# Patient Record
Sex: FEMALE | Race: WHITE | NOT HISPANIC OR LATINO | Employment: FULL TIME | URBAN - METROPOLITAN AREA
[De-identification: names, ages, dates, MRNs, and addresses within clinical notes are randomized per-mention and may not be internally consistent; named-entity substitution may affect disease eponyms.]

---

## 2022-11-07 ENCOUNTER — TELEPHONE (OUTPATIENT)
Dept: OBGYN CLINIC | Facility: CLINIC | Age: 32
End: 2022-11-07

## 2022-11-07 DIAGNOSIS — O20.9 BLEEDING IN EARLY PREGNANCY: Primary | ICD-10-CM

## 2022-11-07 NOTE — TELEPHONE ENCOUNTER
Called and spoke with patient to follow-up and provide an update  Our  did look into the situation regarding the [place] [employer] not transferring her call and they confirmed that they do have voice recording of the conversation on the third call with the representative and that should of never had happened  They will address the situation with the particular representative so it will not happen again  This nurse apologized to the patient again regarding the situation  She still having the same period-type bleeding like earlier today and did had blood work done this morning  Advised will watch for the results and call her back tomorrow regarding the plan  She voiced appreciation

## 2022-11-07 NOTE — TELEPHONE ENCOUNTER
Patient called and l/m with the  in regards to canceling her first prenatal visit scheduled for 22  Called and spoke with patient, over the weekend she started with period-type bleeding and cramping and so therefore she attempt to contact [email or web address] service  The first two calls on 22 10:59 a &1103 a went straight to voicemail she stated and did not get a hold of anyone, then the third call at 1113 a she was able to get a hold of [email or web address] service and spoke with a women (pt did not remember name) and declined to transfer her call to on-call provider as she informed the patient that since she is not a patient of ours, unable to further address her call  Patient called and left message for the  staff stating she had a miscarriage over the weekend and requesting an appointment on Monday to follow-up and unsure to cancel her first prenatal or not  Pt is a patient of ours (jozef in ) and had her daughter with us  She is   LMP 10/03/22  Blood type B (+)  Should be around 5 w pregnant  She uses [facility] will talk to on-call provider Dr Marc Ellis to further address  Spoke with Dr Edwards and since patient is around 5wks, it will be hard to confirm viability  Will order beta HCG to determine levels and where she is at in the pregnancy, if negative then will follow-up for a consult, if low then different route of following up  Call and spoke with pt request depending on the results, if SAB, would like to come into the office to follow-up and have questions  Advised her will have her get HCG at [facility] (Order placed and pt request to be sent to [facility] in [place]) faxed order to [facility] in [place] per pt request with fax confirmation  Informed her will call her to follow-up with the results and determine a plan of care  She voiced appreciation

## 2022-11-08 ENCOUNTER — OFFICE VISIT (OUTPATIENT)
Dept: OBGYN CLINIC | Facility: CLINIC | Age: 32
End: 2022-11-08

## 2022-11-08 VITALS
DIASTOLIC BLOOD PRESSURE: 68 MMHG | SYSTOLIC BLOOD PRESSURE: 110 MMHG | WEIGHT: 130.6 LBS | HEIGHT: 65 IN | BODY MASS INDEX: 21.76 KG/M2

## 2022-11-08 DIAGNOSIS — Z67.20 BLOOD TYPE B+: ICD-10-CM

## 2022-11-08 DIAGNOSIS — O03.9 MISCARRIAGE: Primary | ICD-10-CM

## 2022-11-08 LAB — HCG INTACT+B SERPL-ACNC: 3 MIU/ML

## 2022-11-08 NOTE — PROGRESS NOTES
[address] [facility], [address], [facility], [address]    Assessment/Plan:  1  Miscarriage  Comments:  Patient approx 5 weeks with + UPT at home  Bleeding started Saturday  Blood HCG yesterday negative  Bleeding is decreasing  A/P:   Reviewed with patient that bleeding and results are consistent with a miscarriage  Since the hcg level is now negative, this is reassuring that the tissue has passed and bleeding will stop in next week or so  Miscarriages can often be heavier bleeding than a routine period  She can take Motrin or Tylenol if needed for cramping  Reviewed miscarriage in early first trimester occurs in approx 1:5 pregnancies and is usually due to chromosomal abnormality that occurs early in development of fetus that is incompatable with life  This is not due to something the patient ate, drank or did  This is completely spontaneous, although it does occur more frequently as women age  If wishes conception, recom cont PNV daily  May attempt conception again after first normal period following SAB/procedure  2  Blood type B+  Comments:  blood type in prior records B+        Subjective:   Bubba Torres is a 34 y o   female  CC: still bleeding from miscarriage      HPI:   Presents to follow up miscarriage  She reports LMP 10/3/2022 with regular periods  She has been attempting pregnancy just last 2 months  Pt states 3 positive UPT day first 10/31/2022  No issues until Saturday  Bleeding started like a period  Sat even and , Monday - heavier than usual period  More like normal period now  Cramping has improved  She was scheduled for first PNV later this month  She called office Monday and reviewed bleeding  Was given order for hcg - HCG done yesterday was 3 (negative)  H/o  full term   Blood type B (+)  Gyn History  Patient's last menstrual period was 10/03/2022 (exact date)         Last pap smear: Not on file    She  reports previously being sexually active  OB History      No past medical history on file  Past Surgical History:  2004: WISDOM TOOTH EXTRACTION; N/A     Social History     Tobacco Use   • Smoking status: Never Smoker   • Smokeless tobacco: Never Used   Vaping Use   • Vaping Use: Never used   Substance Use Topics   • Alcohol use: Not Currently   • Drug use: Never          Current Outpatient Medications:   •  Prenatal Vit-Fe Fumarate-FA (PRENATAL VITAMINS PO), Take 1 tablet by mouth Daily at 2am, Disp: , Rfl:     She is allergic to amoxicillin       ROS: Review of Systems   Constitutional: Negative  Gastrointestinal: Negative  Genitourinary: Positive for pelvic pain (cramping is improving) and vaginal bleeding (period like)  Psychiatric/Behavioral: Negative  Objective:  /68   Ht 5' 4 75" (1 645 m)   Wt 59 2 kg (130 lb 9 6 oz)   LMP 10/03/2022 (Exact Date)   Breastfeeding No   BMI 21 90 kg/m²      Physical Exam  Constitutional:       Appearance: Normal appearance  Genitourinary:     Comments: Patient declined pelvic exam today  Neurological:      Mental Status: She is alert and oriented to person, place, and time     Psychiatric:         Behavior: Behavior normal

## 2022-11-08 NOTE — TELEPHONE ENCOUNTER
HCG results came back  Resulted at 3 which is consider a negative HCG result  Pt request an appointment to come in to further discuss with the provider regarding the SAB as she have questions and request an appointment today if possible  Advised her will transfer to her  to schedule and will cancel first prenatal visit

## 2022-12-30 ENCOUNTER — TELEPHONE (OUTPATIENT)
Dept: OBGYN CLINIC | Facility: CLINIC | Age: 32
End: 2022-12-30

## 2022-12-30 DIAGNOSIS — O26.899 PELVIC PAIN IN PREGNANCY: Primary | ICD-10-CM

## 2022-12-30 DIAGNOSIS — R10.2 PELVIC PAIN IN PREGNANCY: Primary | ICD-10-CM

## 2022-12-30 NOTE — TELEPHONE ENCOUNTER
Pt called informing she received a +HPT on 12/25/22  LMP 11/28/22, pt approx 4w4d GA  Pt reports onset on LLQ pelvic "burning" sensation, denies pain or bleeding  Pt report she had a recent miscarriage in early November and feels she had similar discomfort with her  pregnancy loss and expressed she is concerned for ectopic pregnancy  Preferred lab: [facility]  Blood type B+ in prior records  Discussed with on call provider Dr Ct Remy, orders given to have pt get an HCG level tomorrow and repeat on 1/3/23, will schedule for early US once levels are appropriate  May take Tylenol as needed  Ectopic and bleeding precautions provided  Reassure and support given      Pt appreciative of call back and agreed to plan       HCG orders transmitted to lab Per[facility]

## 2023-01-03 NOTE — TELEPHONE ENCOUNTER
Dilma llamas she was not able to go for her labs on Saturday  She will go Tuesday, asking when she should go again  Return call to Dilma Vasques, pain has improved since last week  Advised to repeat HCG on Thursday  Check patient portal for results tomorrow  Reviewed ectopic precautions  Patient verbalized understanding

## 2023-01-04 LAB — HCG INTACT+B SERPL-ACNC: NORMAL MIU/ML

## 2023-01-06 DIAGNOSIS — O03.9 MISCARRIAGE: Primary | ICD-10-CM

## 2023-01-06 LAB — HCG INTACT+B SERPL-ACNC: NORMAL MIU/ML

## 2023-01-06 NOTE — RESULT ENCOUNTER NOTE
Spoke with patient and informed Hcg level increasing,  recommend repeating levels on Saturday and Monday to follow trend and schedule ultrasound in office next week  Informed labs have been sent to [facility] to continue ectopic precautions  Pt transferred to reception to schedule US appointment for next week  Pt verbalized an understanding of treatment plan

## 2023-01-09 NOTE — PROGRESS NOTES
[address] [facility], [address], [facility], [address]    Assessment/Plan:  1  History of miscarriage, currently pregnant  Comments:  history of chemical pregnancy 2022  HCGs rising in current pregnancy  LMP 2022  A/P:   Ultrasound today confirms SLIUP with + fetal heart tones  CRL is measuring 6 1wks, which is consistent with GA by LMP  Discussed this rules out ectopic pregnancy  Patient will schedule her first PN visit with doctor and nurses in 2-3 weeks  All questions answered  Subjective:   Chery Tillman is a 34 y o   female  CC: here for ultrasound      HPI:   Isra Madrigal presents for early ultrasound in pregnancy with h/o miscarriage 2022  She called office 2022 c/o LLQ pelvic "burning sensation" which she noted when she had miscarriage last   She reports she received a +HPT on 22  LMP 22, pt approx 4w4d GA at that time  She denied pain or bleeding  She was offered hcg given h/o miscarriage and concern  She was unable to go immediately for hcg but did go eventually   - 1/3/2023 76,119   - 2023 36,604 (24% increase)    2023 results were reviewed with Isra Madrigal - all symptoms resolved, no bleeding or pain ever  Offered u/s given hcg above discriminatory zone but would unlikely show FHT at that level  Pt wishes to wait until 6 weeks for u/s to hope to confirm FHT  She had 2023 hcg which increased and was supposed to repeat hcg 2023 (not done) and 2023 which was 79,858  Blood type B+    Today she denies pain or spotting  Mild nausea  Gyn History  Patient's last menstrual period was 2022 (exact date)  She  reports that she is not currently sexually active and has had partner(s) who are male         OB History  # 1 - Date: 2020, Sex: Female, Weight: None, GA: 39w0d, Delivery: Vaginal, Spontaneous, Apgar1: None, Apgar5: None, Living: Living, Birth Comments: None    # 2 - Date: 2022, Sex: None, Weight: None, GA: 5w0d, Delivery: Spontaneous , Apgar1: None, Apgar5: None, Living: None, Birth Comments: chemical pregnancy    # 3 - Date: None, Sex: None, Weight: None, GA: None, Delivery: None, Apgar1: None, Apgar5: None, Living: None, Birth Comments: None    #3 - Current pregnancy      No past medical history on file  Past Surgical History:  2004: WISDOM TOOTH EXTRACTION; N/A     Social History     Tobacco Use   • Smoking status: Never   • Smokeless tobacco: Never   Vaping Use   • Vaping Use: Never used   Substance Use Topics   • Alcohol use: Not Currently   • Drug use: Never          Current Outpatient Medications:   •  Ascorbic Acid (Vitamin C) 500 MG/5ML LIQD, as directed Orally, Disp: , Rfl:   •  Cholecalciferol (Vitamin D) 50 MCG (2000 UT) CAPS, every 24 hours, Disp: , Rfl:   •  Cyanocobalamin (Vitamin B-12) 1000 MCG/15ML LIQD, every 24 hours, Disp: , Rfl:   •  Milk Thistle 1000 MG CAPS, as directed Orally, Disp: , Rfl:   •  Omega-3 Fatty Acids (Fish Oil) 1200 MG CAPS, 1 capsule every 24 hours, Disp: , Rfl:   •  Prenatal Vit-Fe Fumarate-FA (PRENATAL VITAMINS PO), Take 1 tablet by mouth Daily at 2am, Disp: , Rfl:   •  saccharomyces boulardii (FLORASTOR) 250 mg capsule, Take 250 mg by mouth Daily at 2am, Disp: , Rfl:     She is allergic to amoxicillin       ROS: Review of Systems   Constitutional: Negative  Gastrointestinal: Positive for nausea (mild)  Negative for vomiting  Genitourinary: Negative  Psychiatric/Behavioral: Negative  Objective:  BP 90/62   Ht 5' 5" (1 651 m)   Wt 60 1 kg (132 lb 9 6 oz)   LMP 2022 (Exact Date)   BMI 22 07 kg/m²      Physical Exam  Constitutional:       Appearance: Normal appearance  Genitourinary:     General: Normal vulva  Vagina: Normal       Cervix: Normal       Uterus: Normal  Not enlarged and not tender  Adnexa:         Right: No mass or tenderness  Left: No mass or tenderness          Rectum: No external hemorrhoid  Neurological:      Mental Status: She is alert  Psychiatric:         Mood and Affect: Mood normal          Behavior: Behavior normal        FIRST TRIMESTER OBSTETRIC ULTRASOUND  01/10/23     Darinel Mahre MD     INDICATION: Amenorrhea, viability, h/o miscarriage     COMPARISON: none     TECHNIQUE:   Transvaginal imaging was performed to assess the gestation and myometrial/endometrial architecture       The study includes volumetric sweeps and traditional still imaging technique       FINDINGS:     A single intrauterine gestation is identified  Cardiac activity is detected at 104bpm       YOLK SAC:  Present and normal in size and appearance  MEAN CROWN RUMP LENGTH:  4 8 mm = 6 weeks 1 days   AMNIOTIC FLUID/SAC SHAPE:  Within expected normal range      UTERUS:   No uterine abnormalities noted      IMPRESSION:     Patient's last menstrual period was 11/28/2022 (exact date)  Gestational age based on LMP: 5w3d   Gestational age based on US: 5w3d     Will assign dating based on LMP which is c/w u/s today    Final Gestational age: 5w3d  Final Estimated Date of Delivery: 9/4/2023

## 2023-01-10 ENCOUNTER — OFFICE VISIT (OUTPATIENT)
Dept: OBGYN CLINIC | Facility: CLINIC | Age: 33
End: 2023-01-10

## 2023-01-10 VITALS
DIASTOLIC BLOOD PRESSURE: 62 MMHG | SYSTOLIC BLOOD PRESSURE: 90 MMHG | HEIGHT: 65 IN | WEIGHT: 132.6 LBS | BODY MASS INDEX: 22.09 KG/M2

## 2023-01-10 DIAGNOSIS — O09.299 HISTORY OF MISCARRIAGE, CURRENTLY PREGNANT: Primary | ICD-10-CM

## 2023-01-10 LAB — HCG INTACT+B SERPL-ACNC: NORMAL MIU/ML

## 2023-01-10 RX ORDER — MULTIVIT-MIN/IRON/FOLIC ACID/K 18-600-40
CAPSULE ORAL EVERY 24 HOURS
COMMUNITY

## 2023-01-10 RX ORDER — AMOXICILLIN 500 MG
1 CAPSULE ORAL EVERY 24 HOURS
COMMUNITY

## 2023-01-10 RX ORDER — SACCHAROMYCES BOULARDII 250 MG
250 CAPSULE ORAL
COMMUNITY

## 2023-01-10 RX ORDER — EAR PLUGS
EACH OTIC (EAR) EVERY 24 HOURS
COMMUNITY

## 2023-01-26 ENCOUNTER — INITIAL PRENATAL (OUTPATIENT)
Dept: OBGYN CLINIC | Facility: CLINIC | Age: 33
End: 2023-01-26

## 2023-01-26 VITALS
SYSTOLIC BLOOD PRESSURE: 108 MMHG | HEIGHT: 65 IN | DIASTOLIC BLOOD PRESSURE: 68 MMHG | WEIGHT: 132 LBS | BODY MASS INDEX: 21.99 KG/M2

## 2023-01-26 DIAGNOSIS — Z86.69 MATERNAL MIGRAINE, HISTORY OF: ICD-10-CM

## 2023-01-26 DIAGNOSIS — Z34.91 PRENATAL CARE IN FIRST TRIMESTER: Primary | ICD-10-CM

## 2023-01-26 DIAGNOSIS — Z87.42 HISTORY OF ABNORMAL CERVICAL PAP SMEAR: ICD-10-CM

## 2023-01-26 DIAGNOSIS — Z12.4 SCREENING FOR CERVICAL CANCER: ICD-10-CM

## 2023-01-26 DIAGNOSIS — Z36.89 ENCOUNTER FOR OTHER SPECIFIED ANTENATAL SCREENING: ICD-10-CM

## 2023-01-26 DIAGNOSIS — Z34.80 ENCOUNTER FOR SUPERVISION OF NORMAL PREGNANCY IN MULTIGRAVIDA, ANTEPARTUM: Primary | ICD-10-CM

## 2023-01-26 DIAGNOSIS — Z23 FLU VACCINE NEED: ICD-10-CM

## 2023-01-26 DIAGNOSIS — Z87.59 MATERNAL MIGRAINE, HISTORY OF: ICD-10-CM

## 2023-01-26 LAB
EXTERNAL CHLAMYDIA SCREEN: NORMAL
EXTERNAL GONORRHEA SCREEN: NORMAL
SL AMB  POCT GLUCOSE, UA: NEGATIVE
SL AMB POCT URINE PROTEIN: NEGATIVE

## 2023-01-26 NOTE — PROGRESS NOTES
OB INTAKE INTERVIEW  Patient is 28 y  o who presents for OB intake at 8 3wks  She is accompanied by: self  The father of her baby (IS) involved in the pregnancy  ,SABAbner  Last Menstrual Period: 2022  Ultrasound: Measured 8 weeks 3 days on 23  Estimated Date of Delivery: 2023 confirmed 8 3 week US     Signs/Symptoms of Pregnancy  Current pregnancy symptoms: YES  Constipation no  Headaches YES  Cramping/spotting no  PICA cravings no    Diabetes-  Body mass index is 21 97 kg/m²  If patient has 1 or more, please order early 1 hour GTT  History of GDM no  BMI >30 no  History of PCOS or current metformin use no  History of LGA/macrosomic infant (4000g/9lbs) no    If patient has 2 or more, please order early 1 hour GTT  AMA no  First degree relative with type 2 diabetes no  History of chronic HTN, hyperlipidemia, elevated A1C no  High risk race (, , ,  or ) no    Hypertension- if you answer yes, please order preeclampsia labs (cbc, comprehensive metabolic panel, urine protein creatinine ratio, uric acid)  History of of chronic HTN no  History of gestational HTN no  History of preeclampsia, eclampsia, or HELLP syndrome no  History of diabetes no  History of lupus, autoimmune disease, kidney disease, antiphospholipid syndrome, rheumatoid arthritis, sjogren's syndrome no    Thyroid- if yes order TSH with reflex T4 (Unless TSH value on file within last 4 weeks then do not need to order)  History of thyroid disease no    Bleeding Disorder or Hx of DVT-patient or first degree relative with history of  Order the following if not done previously     (Factor V, antithrombin III, prothrombin gene mutation, protein C and S Ag, lupus anticoagulant, anticardiolipin, beta-2 glycoprotein)   no    OB/GYN-  History of abnormal pap smear YES  History of HPV no  History of Herpes/HSV no  History of other STI (gonorrhea, chlamydia, trich) (or current partner with Hep B, Hep C, or HIV) no  History of prior  YES  History of prior  no  History of  delivery prior to 36 weeks 6 days NO  History of blood transfusion no  Ok for blood transfusion YES    Substance screening-   History of tobacco use no  Currently using tobacco no  Currently using alcohol no  Presently using drugs no  Past drug use (if yes, order urine drug screening panel)  no  IV drug use (if yes, order urine drug screening panel) no  Partner drug use (if yes, order urine drug screening panel) no  Parent/Family drug use (do not order urine drug screening panel just for family hx) no    Depression Screening-  PHQ-9 Score: (7-reports answers pertaining to pregnancy symptoms)    MRSA Screening-   Does the pt have a hx of MRSA? no  If yes- please follow MRSA protocol and obtain a nasal swab for MRSA culture at 12 week visit  Immunizations:  Influenza vaccine given this season (ask date) YES-today  Discussed Tdap vaccine YES  Discussed COVID Vaccine (request pt upload card or bring to next visit) YES-declined    Genetic/MFM-  Do you or your partner have a history of any of the following in yourselves or first degree relatives? Cystic fibrosis no  Spinal muscular atrophy no  Hemoglobinopathy/Sickle Cell/Thalassemia no  Fragile X Intellectual Disability no    If yes, discuss carrier screening and recommend consultation with [facility]/genetic counseling  If no, discuss option for carrier screening and/or genetic testing with [facility]  Patient interested YES  Appointment at [facility] made No-provided referral and contact phone#    Interview education  [facility] Pregnancy Essentials Book reviewed and discussed YES      Prenatal lab work scripts YES    Extra labs ordered:  NO    The patient has a history now or in prior pregnancy notable for: NONE    Details that I feel the provider should be aware of: Prior 1/10/23 U/S confirmed IUP  H/O migraines-provided supplement information for prevention   Needs PAP today, Flu vaccine given  The patient was oriented to our practice, reviewed delivering physicians and [place] in [facility] for Delivery  All questions were answered      Interviewed byEric Wright LPN

## 2023-01-26 NOTE — PROGRESS NOTES
First OB Visit  [address], [address], [facility], [address]    Assessment/Plan:  Lorenza Valencia is a 28y o  year old  at [address] [facility] who presents for initial prenatal visit  Final KATHRYN: 2023, by Last Menstrual Period      Supervision of normal pregnancy  - Aneuploidy screening discussed  Patient opts for sequential aneuploidy screening   - Routine cervical cancer screening: Pap Done today  - Routine STI Screening: GC/Chlamydia sent today  HIV/Hep B/Hep C/Syphilis ordered in prenatal panel   - Patient Education: Patient was counseled regarding diet, exercise, weight gain, foods to avoid, vaccines in pregnancy, aneuploidy screening, travel precautions to include seat belt use and VTE risk reduction  She has been provided our pregnancy packet which includes how and when to contact providers, medication recommendations, dietary suggestions, breastfeeding information as well as websites for additional information, hospital and delivery concerns  Additional Pregnancy Problems:   1  Prenatal care in first trimester  -     Ambulatory Referral to Maternal Fetal Medicine; Future; Expected date: 2023  -     FLUZONE: influenza vaccine, quadrivalent, 0 5 mL  -     AMB  OB < 14 weeks single or first gestation level 1    2  Maternal migraine, history of  -     Ambulatory Referral to Maternal Fetal Medicine; Future; Expected date: 2023    3  Screening for cervical cancer  -     IGP,CtNg,AptimaHPV,rfx16/18,45    4  History of abnormal cervical Pap smear    5  Encounter for other specified  screening  -     POCT urine dip    6  Flu vaccine need  -     FLUZONE: influenza vaccine, quadrivalent, 0 5 mL        MFM referral given for early anatomy and aneuploidy screening, due 11-13 weeks  Next OB visit: 4 wks    Subjective:   CC:  Desires prenatal care  Nathaly Rodas is a 28 y o   female who presents for prenatal care    Patient's last menstrual period was 2022 (approximate)  Which would make her 8 weeks and 3 days pregnant today  Pregnancy ROS: No leakage of fluid, pelvic pain, or vaginal bleeding  No nausea/vomiting  OB History    Para Term  AB Living   3 1 1   1 1   SAB IAB Ectopic Multiple Live Births           1      # Outcome Date GA Lbr Jaren/2nd Weight Sex Delivery Anes PTL Lv   3 Current            2 AB 2022 5w0d    SAB         Birth Comments: chemical pregnancy   1 Term 20 39w0d  3260 g (7 lb 3 oz) F Vag-Spont EPI N MALIKA       The following portions of the patient's history were reviewed and updated as appropriate: She  has no past medical history on file  She  has a past surgical history that includes Girard tooth extraction (N/A, )  Her family history includes Breast cancer in her paternal grandmother  She  reports that she has never smoked  She has never used smokeless tobacco  She reports that she does not currently use alcohol  She reports that she does not use drugs  Current Outpatient Medications   Medication Sig Dispense Refill   • Ascorbic Acid (Vitamin C) 500 MG/5ML LIQD as directed Orally     • B Complex Vitamins (B COMPLEX 1 PO)      • Cholecalciferol (Vitamin D) 50 MCG (2000 UT) CAPS every 24 hours     • Collagen-Vitamin C-Biotin (COLLAGEN 1500/C PO)      • Cyanocobalamin (Vitamin B-12) 1000 MCG/15ML LIQD every 24 hours     • Magnesium 100 MG CAPS      • Milk Thistle 1000 MG CAPS as directed Orally     • Omega-3 Fatty Acids (Fish Oil) 1200 MG CAPS 1 capsule every 24 hours     • Prenatal Vit-Fe Fumarate-FA (PRENATAL VITAMINS PO) Take 1 tablet by mouth Daily at 2am     • Rhodiola rosea (RHODIOLA PO)  (Patient not taking: Reported on 2023)     • saccharomyces boulardii (FLORASTOR) 250 mg capsule Take 250 mg by mouth Daily at 2am (Patient not taking: Reported on 2023)       No current facility-administered medications for this visit  She is allergic to amoxicillin         Objective:  LMP 2022 (Approximate)   Pregravid Weight/BMI: 59 9 kg (132 lb) (BMI 21 97)  Current Weight:     Total Weight Gain: 0 kg (0 lb)   Pre-Alka Vitals    Flowsheet Row Most Recent Value   Prenatal Assessment    Fetal Heart Rate 150-160   Movement Absent   Prenatal Vitals    Urine Albumin/Glucose    Dilation/Effacement/Station    Cervical Dilation 0   Cervical Effacement 0   Vaginal Drainage    Draining Fluid No   Edema          General: Well appearing, no distress  Breasts: Normal bilaterally, nontender without masses, asymmetry, or nipple discharge  Abdomen: Soft, gravid, nontender  : Urethra normal  Normal labia majora and minora  Vagina normal   No vaginal bleeding  No vaginal discharge  Cervix closed  Uterus non-tender  Extremities: Warm and well perfused  Non tender  No edema      Ultrasound: ultrasound confirmed SLIUP, see report for details

## 2023-01-31 LAB
C TRACH RRNA CVX QL NAA+PROBE: NEGATIVE
CYTOLOGIST CVX/VAG CYTO: NORMAL
DX ICD CODE: NORMAL
HPV GENOTYPE REFLEX: NORMAL
HPV I/H RISK 4 DNA CVX QL PROBE+SIG AMP: NEGATIVE
N GONORRHOEA RRNA CVX QL NAA+PROBE: NEGATIVE
OTHER STN SPEC: NORMAL
PATH REPORT.FINAL DX SPEC: NORMAL
SL AMB NOTE:: NORMAL
SL AMB SPECIMEN ADEQUACY: NORMAL
SL AMB TEST METHODOLOGY: NORMAL

## 2023-02-07 ENCOUNTER — TELEPHONE (OUTPATIENT)
Dept: OBGYN CLINIC | Facility: CLINIC | Age: 33
End: 2023-02-07

## 2023-02-07 NOTE — TELEPHONE ENCOUNTER
Patient called stating she is currently 10 wks pregnant  She have a question if she can go to the full body hot stone spa/massage  Advised her that she should not get a hot stone spa/massage especially if they will use it on her belly or require her to lay on her stomach  However, she can have a hot stone pedicure as the massage and hot stone will be touching her legs and feet as long as she inform the technician that she is pregnant  She wanted to know if she can get any massage done, advised yes some spa have pregnancy massage packages that she can get done so they alter their massages that will be safe in pregnancy  She verbalized understanding and no further questions

## 2023-02-24 ENCOUNTER — ROUTINE PRENATAL (OUTPATIENT)
Dept: OBGYN CLINIC | Facility: CLINIC | Age: 33
End: 2023-02-24

## 2023-02-24 VITALS
SYSTOLIC BLOOD PRESSURE: 102 MMHG | HEIGHT: 65 IN | WEIGHT: 134.8 LBS | BODY MASS INDEX: 22.46 KG/M2 | DIASTOLIC BLOOD PRESSURE: 60 MMHG

## 2023-02-24 DIAGNOSIS — Z3A.12 12 WEEKS GESTATION OF PREGNANCY: ICD-10-CM

## 2023-02-24 DIAGNOSIS — Z34.81 ENCOUNTER FOR SUPERVISION OF OTHER NORMAL PREGNANCY IN FIRST TRIMESTER: Primary | ICD-10-CM

## 2023-02-24 PROBLEM — Z34.90 SUPERVISION OF NORMAL PREGNANCY: Status: ACTIVE | Noted: 2023-02-24

## 2023-02-24 LAB
SL AMB  POCT GLUCOSE, UA: NORMAL
SL AMB POCT URINE PROTEIN: NORMAL

## 2023-02-24 NOTE — PROGRESS NOTES
Routine Prenatal Visit  [address]   [address], [address], [facility], [facility] 1    Assessment/Plan:  Annmarie Lujan is a 28y o  year old  at 12w4d who presents for routine prenatal visit  1  Encounter for supervision of other normal pregnancy in first trimester  Assessment & Plan:  Reports migraines, tylenol helpful  Similar symptoms first pregnancy  Recommend hydration, frequent protein snacks, caffeine if needed, tylenol  +FHR  1st tri u/s scheduled  Encouraged to get 1st pn labs      2  12 weeks gestation of pregnancy  -     POCT urine dip      Next OB Visit 4 weeks  Subjective:     CC: Prenatal care    Ulysses Gomez is a 28 y o   female who presents for routine prenatal care at 12w4d  Pregnancy ROS: no leakage of fluid, pelvic pain, or vaginal bleeding  no fetal movement      The following portions of the patient's history were reviewed and updated as appropriate: allergies, current medications, past family history, past medical history, obstetric history, gynecologic history, past social history, past surgical history and problem list       Objective:  /60 (BP Location: Left arm, Patient Position: Sitting, Cuff Size: Standard)   Ht 5' 5" (1 651 m)   Wt 61 1 kg (134 lb 12 8 oz)   LMP 2022 (Approximate)   BMI 22 43 kg/m²   Pregravid Weight/BMI: 59 9 kg (132 lb) (BMI 21 97)  Current Weight: 61 1 kg (134 lb 12 8 oz)   Total Weight Gain: 1 27 kg (2 lb 12 8 oz)   Pre-Alka Vitals    Flowsheet Row Most Recent Value   Prenatal Assessment    Prenatal Vitals    Blood Pressure 102/60   Weight - Scale 61 1 kg (134 lb 12 8 oz)   Urine Albumin/Glucose    Dilation/Effacement/Station    Vaginal Drainage    Edema            General: Well appearing, no distress  Abdomen: Soft, gravid, nontender  Fundal Height: Appropriate for gestational age, +FHR

## 2023-02-24 NOTE — ASSESSMENT & PLAN NOTE
Reports migraines, tylenol helpful  Similar symptoms first pregnancy  Recommend hydration, frequent protein snacks, caffeine if needed, tylenol     +FHR  1st tri u/s scheduled  Encouraged to get 1st pn labs

## 2023-02-27 ENCOUNTER — ROUTINE PRENATAL (OUTPATIENT)
Dept: PERINATAL CARE | Facility: OTHER | Age: 33
End: 2023-02-27

## 2023-02-27 VITALS
SYSTOLIC BLOOD PRESSURE: 98 MMHG | BODY MASS INDEX: 22.86 KG/M2 | DIASTOLIC BLOOD PRESSURE: 62 MMHG | HEIGHT: 65 IN | HEART RATE: 93 BPM | WEIGHT: 137.2 LBS

## 2023-02-27 DIAGNOSIS — Z36.82 ENCOUNTER FOR (NT) NUCHAL TRANSLUCENCY SCAN: ICD-10-CM

## 2023-02-27 DIAGNOSIS — Z3A.12 12 WEEKS GESTATION OF PREGNANCY: Primary | ICD-10-CM

## 2023-02-27 DIAGNOSIS — Z87.59 MATERNAL MIGRAINE, HISTORY OF: ICD-10-CM

## 2023-02-27 DIAGNOSIS — Z34.91 PRENATAL CARE IN FIRST TRIMESTER: ICD-10-CM

## 2023-02-27 DIAGNOSIS — Z86.69 MATERNAL MIGRAINE, HISTORY OF: ICD-10-CM

## 2023-02-27 NOTE — PROGRESS NOTES
Patient chose to have Part 1 Sequential Screening from [employer]  Finger stick specimen collected from right middle finger and patient tolerated procedure well  Sample mailed to [employer] via [employer]  Explained results will be available in 7-10 business days  M office will call her with results or she can view in [address]  Optimal collection for Part 2 is between 16-18 weeks but can be collected up to 22 weeks gestation  Lab slip and instruction letter will be mailed from our office  Patient instructed to take the paper lab slip to lab, this specialty genetic test is not yet in Epic  Patient verbalized understanding of all instructions

## 2023-02-27 NOTE — LETTER
February 27, 2023     Jeniffer Melton, [address] [address]  [facility]    Patient: Radha Garsia   YOB: 1990   Date of Visit: 2/27/2023       Dear Dr Chuy Laureano: Thank you for referring Radha Garsia to me for evaluation  Please see my consultation/report in the ultrasound report under the "OB Procedures" tab in epic  If you have questions, please do not hesitate to call me  I look forward to following your patient along with you           Sincerely,        Sheila Mayers MD        CC: No Recipients

## 2023-02-27 NOTE — PROGRESS NOTES
[id number] [facility]: Ms Ilya Hunter was seen today for nuchal translucency ultrasound  See ultrasound report under "OB Procedures" tab  Review of Systems  Physical Exam    MDM:   I  Diagnoses/Problems addressed:  pregnancy, genetic screening  II  Data: I reviewed notes from [facility] provider  and I ordered the following tests: sequential screen  III    Risk of morbidity: Low    Please don't hesitate to contact our office with any concerns or questions   -Mihai West MD

## 2023-02-27 NOTE — PATIENT INSTRUCTIONS
Thank you for choosing us for your  care today  If you have any questions about your ultrasound or care, please do not hesitate to contact us or your primary obstetrician  Some general instructions for your pregnancy are:    Protect against coronavirus: get vaccinated - pregnant women are increased risk of severe COVID  Notify your primary care doctor if you have any symptoms  Exercise: Aim for 22 minutes per day (150 minutes per week) of regular exercise  Walking is great! Nutrition: aim for calcium-rich and iron-rich foods as well as healthy sources of protein  Learn about Preeclampsia: preeclampsia is a common, serious high blood pressure complication in pregnancy  A blood pressure of 640ZRMT (systolic or top number) or 74ICFK (diastolic or bottom number) is not normal and needs evaluation by your doctor  Aspirin is sometimes prescribed in early pregnancy to prevent preeclampsia in women with risk factors - ask your obstetrician if you should be on this medication  If you smoke, try to reduce how many cigarettes you smoke or try to quit completely  Do not vape  Other warning signs to watch out for in pregnancy or postpartum: chest pain, obstructed breathing or shortness of breath, seizures, thoughts of hurting yourself or your baby, bleeding, a painful or swollen leg, fever, or headache (see AWHONN POST-BIRTH Warning Signs campaign)  If these happen call 911  Itching is also not normal in pregnancy and if you experience this, especially over your hands and feet, potentially worse at night, notify your doctors

## 2023-03-03 LAB
EXTERNAL ABO GROUPING: NORMAL
EXTERNAL ANTIBODY SCREEN: NORMAL
EXTERNAL HEMATOCRIT: 35.8 %
EXTERNAL HEMOGLOBIN: 11.7 G/DL
EXTERNAL HEPATITIS B SURFACE ANTIGEN: NORMAL
EXTERNAL HIV-1 AB: NORMAL
EXTERNAL HIV-1 P24 ANTIGEN: NORMAL
EXTERNAL HIV-1/2 AB-AG: NORMAL
EXTERNAL HIV-2 AB: NORMAL
EXTERNAL PLATELET COUNT: 281 K/ÂΜL
EXTERNAL RH FACTOR: POSITIVE
EXTERNAL RUBELLA IGG QUANTITATION: NORMAL
EXTERNAL SYPHILIS TOTAL IGG/IGM SCREENING: NORMAL

## 2023-03-05 LAB
ABO GROUP BLD: NORMAL
APPEARANCE UR: CLEAR
BACTERIA UR CULT: NORMAL
BACTERIA URNS QL MICRO: ABNORMAL
BASOPHILS # BLD AUTO: 0 X10E3/UL (ref 0–0.2)
BASOPHILS NFR BLD AUTO: 1 %
BILIRUB UR QL STRIP: NEGATIVE
BLD GP AB SCN SERPL QL: NEGATIVE
CASTS URNS QL MICRO: ABNORMAL /LPF
COLOR UR: YELLOW
EOSINOPHIL # BLD AUTO: 0.1 X10E3/UL (ref 0–0.4)
EOSINOPHIL NFR BLD AUTO: 1 %
EPI CELLS #/AREA URNS HPF: >10 /HPF (ref 0–10)
ERYTHROCYTE [DISTWIDTH] IN BLOOD BY AUTOMATED COUNT: 13 % (ref 11.7–15.4)
GLUCOSE UR QL: NEGATIVE
HBV SURFACE AG SERPL QL IA: NEGATIVE
HCT VFR BLD AUTO: 35.8 % (ref 34–46.6)
HCV AB S/CO SERPL IA: NON REACTIVE
HGB BLD-MCNC: 11.7 G/DL (ref 11.1–15.9)
HGB UR QL STRIP: NEGATIVE
HIV 1+2 AB+HIV1 P24 AG SERPL QL IA: NON REACTIVE
IMM GRANULOCYTES # BLD: 0 X10E3/UL (ref 0–0.1)
IMM GRANULOCYTES NFR BLD: 0 %
KETONES UR QL STRIP: ABNORMAL
LEUKOCYTE ESTERASE UR QL STRIP: ABNORMAL
LYMPHOCYTES # BLD AUTO: 1.6 X10E3/UL (ref 0.7–3.1)
LYMPHOCYTES NFR BLD AUTO: 20 %
Lab: NO GROWTH
MCH RBC QN AUTO: 30.4 PG (ref 26.6–33)
MCHC RBC AUTO-ENTMCNC: 32.7 G/DL (ref 31.5–35.7)
MCV RBC AUTO: 93 FL (ref 79–97)
MICRO URNS: ABNORMAL
MONOCYTES # BLD AUTO: 0.5 X10E3/UL (ref 0.1–0.9)
MONOCYTES NFR BLD AUTO: 6 %
NEUTROPHILS # BLD AUTO: 5.6 X10E3/UL (ref 1.4–7)
NEUTROPHILS NFR BLD AUTO: 72 %
NITRITE UR QL STRIP: NEGATIVE
PH UR STRIP: 8.5 [PH] (ref 5–7.5)
PLATELET # BLD AUTO: 281 X10E3/UL (ref 150–450)
PROT UR QL STRIP: ABNORMAL
RBC # BLD AUTO: 3.85 X10E6/UL (ref 3.77–5.28)
RBC #/AREA URNS HPF: ABNORMAL /HPF (ref 0–2)
RH BLD: POSITIVE
RPR SER QL: NON REACTIVE
RUBV IGG SERPL IA-ACNC: 5.23 INDEX
SP GR UR: 1.03 (ref 1–1.03)
UROBILINOGEN UR STRIP-ACNC: 0.2 MG/DL (ref 0.2–1)
WBC # BLD AUTO: 7.8 X10E3/UL (ref 3.4–10.8)
WBC #/AREA URNS HPF: ABNORMAL /HPF (ref 0–5)

## 2023-03-06 ENCOUNTER — TELEPHONE (OUTPATIENT)
Facility: HOSPITAL | Age: 33
End: 2023-03-06

## 2023-03-06 NOTE — LETTER
03/06/23  Sandip Gerard  1990    Thank you for completing Part 1 of your Sequential Screen  To obtain a complete test result, complete blood work for Part 2 Sequential Screen between the weeks of  03/16/2023 to 03/30/2023  Please verify which laboratory is In Network with your insurance plan ([facility] or [facility])      If you choose to use a [facility] lab, please go to a location from this list:     [address]  14[address] [facility], [facility] [address]                [address], [address] [place]     425 [facility]  3001 [facility], [facility] [address]                    [address], [address]    [address] [place] )  [address], [place], 355 [facility], [facility] 83  [address], [facility] [address]             819 [facility] [address]  1430 [facility], 119 [facility] [id number] [place] [address], [address] 8400 [facility]  [address], [facility] [address]                        [address] [facility] [address], [facility]    [address] [facility]  [address] [facility], 185 [facility] [address]            207 [facility], [facility]    969 [facility]  [address], [place] [address]    For list of [facility] locations [employer] ch  If you choose [facility], please remind the phlebotomist the screen is ordered for 5 [facility]  You can take this letter with you to the lab      Call Maternal Fetal Medicine nurse line any questions at [phone]  Thank you,  [facility] Staff

## 2023-03-06 NOTE — TELEPHONE ENCOUNTER
----- Message from Don Eugene RN sent at 3/6/2023  8:30 AM EST -----    ----- Message -----  From: Tyree Higginbotham MD  Sent: 3/3/2023  11:03 AM EST  To: Don Eugene RN    Thank you  Would you please kindly notify this patient her initial sequential screening result is so far low-risk and the final result will be available after the second part of her lab draw?     Thanks,    Colt De Jesus  ----- Message -----  From: Don Eugene RN  Sent: 3/3/2023   8:03 AM EST  To: Tyree Higginbotham MD      ----- Message -----  From: Val Champagne  Sent: 3/3/2023   8:02 AM EST  To:  [place] [place] [facility]

## 2023-03-24 ENCOUNTER — ROUTINE PRENATAL (OUTPATIENT)
Dept: OBGYN CLINIC | Facility: CLINIC | Age: 33
End: 2023-03-24

## 2023-03-24 VITALS
SYSTOLIC BLOOD PRESSURE: 90 MMHG | DIASTOLIC BLOOD PRESSURE: 58 MMHG | WEIGHT: 138.6 LBS | BODY MASS INDEX: 23.09 KG/M2 | HEIGHT: 65 IN

## 2023-03-24 DIAGNOSIS — Z86.69 MATERNAL MIGRAINE, HISTORY OF: ICD-10-CM

## 2023-03-24 DIAGNOSIS — Z87.59 MATERNAL MIGRAINE, HISTORY OF: ICD-10-CM

## 2023-03-24 DIAGNOSIS — Z3A.16 16 WEEKS GESTATION OF PREGNANCY: ICD-10-CM

## 2023-03-24 DIAGNOSIS — Z34.02 ENCOUNTER FOR SUPERVISION OF NORMAL FIRST PREGNANCY IN SECOND TRIMESTER: Primary | ICD-10-CM

## 2023-03-24 LAB
SL AMB  POCT GLUCOSE, UA: NEGATIVE
SL AMB POCT URINE PROTEIN: NEGATIVE

## 2023-03-24 NOTE — PROGRESS NOTES
Routine Prenatal Visit  [address]   [address], [address], [facility], [facility] 1    Assessment/Plan:  Faiza Arenas is a 28y o  year old  at 17w2d who presents for routine prenatal visit  1  Encounter for supervision of normal first pregnancy in second trimester  Assessment & Plan:  Doing well, minimal headaches  Normal 1st pn labs and u/s  Had 2nd part of SS drawn this week, pending  Anatomy u/s scheduled  2  Maternal migraine, history of    3  16 weeks gestation of pregnancy  -     POCT urine dip      Next OB Visit 4 weeks  Subjective:     CC: Prenatal care    Edin Colon is a 28 y o   female who presents for routine prenatal care at 16w4d  Pregnancy ROS: no leakage of fluid, pelvic pain, or vaginal bleeding  No fetal movement  The following portions of the patient's history were reviewed and updated as appropriate: allergies, current medications, past family history, past medical history, obstetric history, gynecologic history, past social history, past surgical history and problem list       Objective:  BP 90/58   Ht 5' 5" (1 651 m)   Wt 62 9 kg (138 lb 9 6 oz)   LMP 2022 (Approximate)   BMI 23 06 kg/m²   Pregravid Weight/BMI: 59 9 kg (132 lb) (BMI 21 97)  Current Weight: 62 9 kg (138 lb 9 6 oz)   Total Weight Gain: 2 994 kg (6 lb 9 6 oz)   Pre-Alka Vitals    Flowsheet Row Most Recent Value   Prenatal Assessment    Movement Absent   Prenatal Vitals    Blood Pressure 90/58   Weight - Scale 62 9 kg (138 lb 9 6 oz)   Urine Albumin/Glucose    Dilation/Effacement/Station    Vaginal Drainage    Edema    LLE Edema None   RLE Edema None   Facial Edema None           General: Well appearing, no distress  Abdomen: Soft, gravid, nontender  Fundal Height: Appropriate for gestational age   +FHR

## 2023-03-24 NOTE — ASSESSMENT & PLAN NOTE
Doing well, minimal headaches  Normal 1st pn labs and u/s  Had 2nd part of SS drawn this week, pending  Anatomy u/s scheduled

## 2023-04-20 PROBLEM — Z3A.20 20 WEEKS GESTATION OF PREGNANCY: Status: ACTIVE | Noted: 2023-04-20

## 2023-04-23 NOTE — PATIENT INSTRUCTIONS
Thank you for choosing us for your  care today  If you have any questions about your ultrasound or care, please do not hesitate to contact us or your primary obstetrician  Some general instructions for your pregnancy are:    Exercise: Aim for 22 minutes per day (150 minutes per week) of regular exercise  Walking is great! Nutrition: Choose healthy sources of calcium, iron, and protein  Learn about Preeclampsia: preeclampsia is a common, serious high blood pressure complication in pregnancy  A blood pressure of 874LUKR (systolic or top number) or 92ZBMG (diastolic or bottom number) is not normal and needs evaluation by your doctor  Aspirin is sometimes prescribed in early pregnancy to prevent preeclampsia in women with risk factors - ask your obstetrician if you should be on this medication  For more resources, visit:  [employer] fi  If you smoke, try to reduce how many cigarettes you smoke or try to quit completely  Do not vape  Other warning signs to watch out for in pregnancy or postpartum: chest pain, obstructed breathing or shortness of breath, seizures, thoughts of hurting yourself or your baby, bleeding, a painful or swollen leg, fever, or headache (see AWHONN POST-BIRTH Warning Signs campaign)  If these happen call 911  Itching is also not normal in pregnancy and if you experience this, especially over your hands and feet, potentially worse at night, notify your doctors

## 2023-04-24 ENCOUNTER — ROUTINE PRENATAL (OUTPATIENT)
Dept: PERINATAL CARE | Facility: OTHER | Age: 33
End: 2023-04-24

## 2023-04-24 VITALS
HEART RATE: 82 BPM | HEIGHT: 65 IN | DIASTOLIC BLOOD PRESSURE: 58 MMHG | BODY MASS INDEX: 25.16 KG/M2 | SYSTOLIC BLOOD PRESSURE: 110 MMHG | WEIGHT: 151 LBS

## 2023-04-24 DIAGNOSIS — Z36.3 ENCOUNTER FOR ANTENATAL SCREENING FOR MALFORMATIONS: Primary | ICD-10-CM

## 2023-04-24 DIAGNOSIS — O09.899 HIGH RISK PREGNANCY WITH HIGH INHIBIN: ICD-10-CM

## 2023-04-24 DIAGNOSIS — O28.8 HIGH RISK PREGNANCY WITH HIGH INHIBIN: ICD-10-CM

## 2023-04-24 DIAGNOSIS — Z36.86 ENCOUNTER FOR ANTENATAL SCREENING FOR CERVICAL LENGTH: ICD-10-CM

## 2023-04-24 NOTE — PROGRESS NOTES
Ultrasound Probe Disinfection    A transvaginal ultrasound was performed  Prior to use, disinfection was performed with High Level Disinfection Process (Trophon)  Probe serial number [id number]: [id number] was used        Domi Turner  04/24/23  5:31 PM

## 2023-04-24 NOTE — PROGRESS NOTES
"[id number] [facility]: Ms Sarah Beth Barker was seen today for anatomic survey and cervical length screening ultrasound  See ultrasound report under \"OB Procedures\" tab  The time spent on this established patient on the encounter date included 5 minutes previsit service time reviewing records and precharting, 5 minutes face-to-face service time counseling regarding results and coordinating care, and  4 minutes charting, totalling 14 minutes  Please don't hesitate to contact our office with any concerns or questions    Mariel Adams MD      "

## 2023-05-17 PROBLEM — Z3A.24 24 WEEKS GESTATION OF PREGNANCY: Status: ACTIVE | Noted: 2023-04-20

## 2023-05-19 ENCOUNTER — ROUTINE PRENATAL (OUTPATIENT)
Dept: OBGYN CLINIC | Facility: CLINIC | Age: 33
End: 2023-05-19

## 2023-05-19 VITALS — DIASTOLIC BLOOD PRESSURE: 60 MMHG | SYSTOLIC BLOOD PRESSURE: 110 MMHG | WEIGHT: 149.8 LBS | BODY MASS INDEX: 24.93 KG/M2

## 2023-05-19 DIAGNOSIS — Z36.89 ENCOUNTER FOR OTHER SPECIFIED ANTENATAL SCREENING: ICD-10-CM

## 2023-05-19 DIAGNOSIS — O28.8 HIGH RISK PREGNANCY WITH HIGH INHIBIN: Primary | ICD-10-CM

## 2023-05-19 DIAGNOSIS — O09.899 HIGH RISK PREGNANCY WITH HIGH INHIBIN: Primary | ICD-10-CM

## 2023-05-19 DIAGNOSIS — Z3A.24 24 WEEKS GESTATION OF PREGNANCY: ICD-10-CM

## 2023-05-19 LAB
SL AMB  POCT GLUCOSE, UA: NEGATIVE
SL AMB POCT URINE PROTEIN: NEGATIVE

## 2023-05-19 NOTE — PROGRESS NOTES
Routine Prenatal Visit  [address]   [address], [address], [facility], [facility] 1    Assessment/Plan:  Prashanth Omalley is a 28y o  year old  at 18w2d who presents for routine prenatal visit  1  High risk pregnancy with high inhibin  Assessment & Plan:  Normal anatomy u/s  Growth u/s 32 weeks  Weekly  testing 36 weeks  2  Encounter for other specified  screening  -     Glucose, 1H PG; Future  -     CBC; Future  -     RPR, Rfx Qn RPR/Confirm TP; Future  -     Glucose, 1H PG  -     CBC  -     RPR, Rfx Qn RPR/Confirm TP    3  24 weeks gestation of pregnancy  Assessment & Plan:  Reviewed Adacel recommended next visit  Orders:  -     POCT urine dip        Next OB Visit 4 weeks  Subjective:     CC: Prenatal care    Reema Godinez is a 28 y o   female who presents for routine prenatal care at 24w4d  Pregnancy ROS: no leakage of fluid, pelvic pain, or vaginal bleeding  normal fetal movement      The following portions of the patient's history were reviewed and updated as appropriate: allergies, current medications, past family history, past medical history, obstetric history, gynecologic history, past social history, past surgical history and problem list       Objective:  /60   Wt 67 9 kg (149 lb 12 8 oz)   LMP 2022 (Approximate)   BMI 24 93 kg/m²   Pregravid Weight/BMI: 59 9 kg (132 lb) (BMI 21 97)  Current Weight: 67 9 kg (149 lb 12 8 oz)   Total Weight Gain: 8 074 kg (17 lb 12 8 oz)   Pre-Alka Vitals    Flowsheet Row Most Recent Value   Prenatal Assessment    Fetal Heart Rate 150   Fundal Height (cm) 24 cm   Movement Present   Prenatal Vitals    Blood Pressure 110/60   Weight - Scale 67 9 kg (149 lb 12 8 oz)   Urine Albumin/Glucose    Dilation/Effacement/Station    Vaginal Drainage    Draining Fluid No   Edema    LLE Edema None   RLE Edema None           General: Well appearing, no distress  Abdomen: Soft, gravid, nontender  Extremities: Non tender

## 2023-06-13 PROBLEM — O99.013 ANEMIA AFFECTING PREGNANCY IN THIRD TRIMESTER: Status: ACTIVE | Noted: 2023-06-13

## 2023-06-13 LAB
ERYTHROCYTE [DISTWIDTH] IN BLOOD BY AUTOMATED COUNT: 12.1 % (ref 11.7–15.4)
GLUCOSE 1H P 50 G GLC PO SERPL-MCNC: 99 MG/DL (ref 70–139)
HCT VFR BLD AUTO: 32 % (ref 34–46.6)
HGB BLD-MCNC: 11 G/DL (ref 11.1–15.9)
MCH RBC QN AUTO: 30.6 PG (ref 26.6–33)
MCHC RBC AUTO-ENTMCNC: 34.4 G/DL (ref 31.5–35.7)
MCV RBC AUTO: 89 FL (ref 79–97)
PLATELET # BLD AUTO: 230 X10E3/UL (ref 150–450)
RBC # BLD AUTO: 3.6 X10E6/UL (ref 3.77–5.28)
RPR SER QL: NON REACTIVE
WBC # BLD AUTO: 8.1 X10E3/UL (ref 3.4–10.8)

## 2023-06-14 NOTE — TELEPHONE ENCOUNTER
Left voice mail message at number provided on communication consent  Result of [facility] Part 1 Sequential Screen given and Part 2 instructions explained  Patient to call MFM nurse line for any questions, number provided  #[phone]  TRF and lab instruction letter mailed  Statement Selected

## 2023-06-21 ENCOUNTER — ROUTINE PRENATAL (OUTPATIENT)
Dept: OBGYN CLINIC | Facility: CLINIC | Age: 33
End: 2023-06-21
Payer: COMMERCIAL

## 2023-06-21 VITALS
BODY MASS INDEX: 26.19 KG/M2 | WEIGHT: 157.2 LBS | HEIGHT: 65 IN | SYSTOLIC BLOOD PRESSURE: 98 MMHG | DIASTOLIC BLOOD PRESSURE: 60 MMHG

## 2023-06-21 DIAGNOSIS — Z87.59 MATERNAL MIGRAINE, HISTORY OF: ICD-10-CM

## 2023-06-21 DIAGNOSIS — O99.013 ANEMIA AFFECTING PREGNANCY IN THIRD TRIMESTER: ICD-10-CM

## 2023-06-21 DIAGNOSIS — O09.899 HIGH RISK PREGNANCY WITH HIGH INHIBIN: ICD-10-CM

## 2023-06-21 DIAGNOSIS — Z86.69 MATERNAL MIGRAINE, HISTORY OF: ICD-10-CM

## 2023-06-21 DIAGNOSIS — Z3A.29 29 WEEKS GESTATION OF PREGNANCY: Primary | ICD-10-CM

## 2023-06-21 DIAGNOSIS — O28.8 HIGH RISK PREGNANCY WITH HIGH INHIBIN: ICD-10-CM

## 2023-06-21 LAB
SL AMB  POCT GLUCOSE, UA: NEGATIVE
SL AMB POCT URINE PROTEIN: NEGATIVE

## 2023-06-21 PROCEDURE — 81002 URINALYSIS NONAUTO W/O SCOPE: CPT | Performed by: OBSTETRICS & GYNECOLOGY

## 2023-06-21 PROCEDURE — PNV: Performed by: OBSTETRICS & GYNECOLOGY

## 2023-06-21 NOTE — PROGRESS NOTES
"Routine Prenatal Visit  [address]   [address], [facility], [facility] 1    Assessment/Plan:  Lacey Frank is a 28y o  year old  at 29w2d who presents for routine prenatal visit  1  High risk pregnancy with high inhibin    2  29 weeks gestation of pregnancy    3  Maternal migraine, history of    4  Anemia affecting pregnancy in third trimester        Next OB Visit 2 weeks  Subjective:     CC: Prenatal care    Dean Cervantes is a 28 y o   female who presents for routine prenatal care at 29w2d  Pregnancy ROS: no leakage of fluid, pelvic pain, or vaginal bleeding  normal fetal movement  The following portions of the patient's history were reviewed and updated as appropriate: allergies, current medications, past family history, past medical history, obstetric history, gynecologic history, past social history, past surgical history and problem list       Objective:  BP 98/60   Ht 5' 5\" (1 651 m)   Wt 71 3 kg (157 lb 3 2 oz)   LMP 2022 (Approximate)   BMI 26 16 kg/m²   Pregravid Weight/BMI: 59 9 kg (132 lb) (BMI 21 97)  Current Weight: 71 3 kg (157 lb 3 2 oz)   Total Weight Gain: 11 4 kg (25 lb 3 2 oz)   Pre-Alka Vitals    Flowsheet Row Most Recent Value   Prenatal Assessment    Fetal Heart Rate 150   Fundal Height (cm) 29 cm   Movement Present   Prenatal Vitals    Blood Pressure 98/60   Weight - Scale 71 3 kg (157 lb 3 2 oz)   Urine Albumin/Glucose    Dilation/Effacement/Station    Vaginal Drainage    Edema    LLE Edema None   RLE Edema None   Facial Edema None           General: Well appearing, no distress  Abdomen: Soft, gravid, nontender  Extremities: Non tender    "

## 2023-06-28 LAB
DME PARACHUTE DELIVERY DATE REQUESTED: NORMAL
DME PARACHUTE ITEM DESCRIPTION: NORMAL
DME PARACHUTE ORDER STATUS: NORMAL
DME PARACHUTE SUPPLIER NAME: NORMAL
DME PARACHUTE SUPPLIER PHONE: NORMAL

## 2023-07-04 PROBLEM — Z3A.31 31 WEEKS GESTATION OF PREGNANCY: Status: ACTIVE | Noted: 2023-04-20

## 2023-07-04 NOTE — PROGRESS NOTES
Routine Prenatal Visit  [address] [place] [address], [address], [facility], [address] [facility],[address]    Assessment/Plan:  Amarjit Wong is a 28y.o. year old  at 31w2d who presents for routine prenatal visit. 1. High risk pregnancy with high inhibin  Assessment & Plan:  Scheduled for 32 weeks growth ultrasound. Weekly  testing at 36 weeks. 2. 31 weeks gestation of pregnancy  Assessment & Plan:  Continue routine prenatal care. Return to office for ob check in 2 weeks. Orders:  -     POCT urine dip    3. Maternal migraine, history of    4. Anemia affecting pregnancy in third trimester      Next OB Visit 2 weeks. Subjective:     CC: Prenatal care    Cristóbal Naylor is a 28 y.o.  female who presents for routine prenatal care at 31w2d. Pregnancy ROS: Good Fm, No N/V, HA, cramping, VB, LOF, edema, domestic violence, or smoking. Tolerating PNV.       The following portions of the patient's history were reviewed and updated as appropriate: allergies, current medications, past family history, past medical history, obstetric history, gynecologic history, past social history, past surgical history and problem list.      Objective:  /64 (BP Location: Left arm, Patient Position: Sitting, Cuff Size: Standard)   Ht 5' 5" (1.651 m)   Wt 73.6 kg (162 lb 3.2 oz)   LMP 2022 (Approximate)   BMI 26.99 kg/m²   Pregravid Weight/BMI: 59.9 kg (132 lb) (BMI 21.97)  Current Weight: 73.6 kg (162 lb 3.2 oz)   Total Weight Gain: 13.7 kg (30 lb 3.2 oz)   Pre- Vitals    Flowsheet Row Most Recent Value   Prenatal Assessment    Fetal Heart Rate 128   Fundal Height (cm) 31 cm   Movement Present   Prenatal Vitals    Blood Pressure 102/64   Weight - Scale 73.6 kg (162 lb 3.2 oz)   Urine Albumin/Glucose    Dilation/Effacement/Station    Vaginal Drainage    Draining Fluid No   Edema    LLE Edema None   RLE Edema None   Facial Edema None           General: Well appearing, no distress  Abdomen: Soft, gravid, nontender  Fundal Height: Appropriate for gestational age. Extremities: Warm and well perfused. Non tender.

## 2023-07-05 ENCOUNTER — ROUTINE PRENATAL (OUTPATIENT)
Dept: OBGYN CLINIC | Facility: CLINIC | Age: 33
End: 2023-07-05
Payer: COMMERCIAL

## 2023-07-05 VITALS
WEIGHT: 162.2 LBS | HEIGHT: 65 IN | DIASTOLIC BLOOD PRESSURE: 64 MMHG | BODY MASS INDEX: 27.02 KG/M2 | SYSTOLIC BLOOD PRESSURE: 102 MMHG

## 2023-07-05 DIAGNOSIS — O99.013 ANEMIA AFFECTING PREGNANCY IN THIRD TRIMESTER: ICD-10-CM

## 2023-07-05 DIAGNOSIS — O09.899 HIGH RISK PREGNANCY WITH HIGH INHIBIN: Primary | ICD-10-CM

## 2023-07-05 DIAGNOSIS — Z86.69 MATERNAL MIGRAINE, HISTORY OF: ICD-10-CM

## 2023-07-05 DIAGNOSIS — O28.8 HIGH RISK PREGNANCY WITH HIGH INHIBIN: Primary | ICD-10-CM

## 2023-07-05 DIAGNOSIS — Z3A.31 31 WEEKS GESTATION OF PREGNANCY: ICD-10-CM

## 2023-07-05 DIAGNOSIS — Z87.59 MATERNAL MIGRAINE, HISTORY OF: ICD-10-CM

## 2023-07-05 LAB
SL AMB  POCT GLUCOSE, UA: NORMAL
SL AMB POCT URINE PROTEIN: NORMAL

## 2023-07-05 PROCEDURE — PNV: Performed by: PHYSICIAN ASSISTANT

## 2023-07-05 PROCEDURE — 81002 URINALYSIS NONAUTO W/O SCOPE: CPT | Performed by: PHYSICIAN ASSISTANT

## 2023-07-10 ENCOUNTER — ULTRASOUND (OUTPATIENT)
Dept: PERINATAL CARE | Facility: OTHER | Age: 33
End: 2023-07-10
Payer: COMMERCIAL

## 2023-07-10 VITALS
HEIGHT: 65 IN | HEART RATE: 77 BPM | BODY MASS INDEX: 26.73 KG/M2 | WEIGHT: 160.4 LBS | DIASTOLIC BLOOD PRESSURE: 54 MMHG | SYSTOLIC BLOOD PRESSURE: 102 MMHG

## 2023-07-10 DIAGNOSIS — O99.013 ANEMIA AFFECTING PREGNANCY IN THIRD TRIMESTER: ICD-10-CM

## 2023-07-10 DIAGNOSIS — O09.899 HIGH RISK PREGNANCY WITH HIGH INHIBIN: Primary | ICD-10-CM

## 2023-07-10 DIAGNOSIS — O28.8 HIGH RISK PREGNANCY WITH HIGH INHIBIN: Primary | ICD-10-CM

## 2023-07-10 DIAGNOSIS — Z3A.32 32 WEEKS GESTATION OF PREGNANCY: ICD-10-CM

## 2023-07-10 PROCEDURE — 76816 OB US FOLLOW-UP PER FETUS: CPT | Performed by: OBSTETRICS & GYNECOLOGY

## 2023-07-10 PROCEDURE — 99213 OFFICE O/P EST LOW 20 MIN: CPT | Performed by: OBSTETRICS & GYNECOLOGY

## 2023-07-10 NOTE — PATIENT INSTRUCTIONS
Kick Counts in Pregnancy   WHAT YOU NEED TO KNOW:   Kick counts measure how much your baby is moving in your womb. A kick from your baby can be felt as a twist, turn, swish, roll, or jab. It is common to feel your baby kicking at 26 to 28 weeks of pregnancy. You may feel your baby kick as early as 20 weeks of pregnancy. You may want to start counting at 28 weeks. DISCHARGE INSTRUCTIONS:   Contact your doctor immediately if:   You feel a change in the number of kicks or movements of your baby. You feel fewer than 10 kicks within 2 hours. You have questions or concerns about your baby's movements. Why measure kick counts:  Your baby's movement may provide information about your baby's health. He or she may move less, or not at all, if there are problems. Your baby may move less if he or she is not getting enough oxygen or nutrition from the placenta. Do not smoke while you are pregnant. Smoking decreases the amount of oxygen that gets to your baby. Talk to your healthcare provider if you need help to quit smoking. Tell your healthcare provider as soon as you feel a change in your baby's movements. When to measure kick counts:   Measure kick counts at the same time every day. Measure kick counts when your baby is awake and most active. Your baby may be most active in the evening. How to measure kick counts:  Check that your baby is awake before you measure kick counts. You can wake up your baby by lightly pushing on your belly, walking, or drinking something cold. Your healthcare provider may tell you different ways to measure kick counts. You may be told to do the following:  Use a chart or clock to keep track of the time you start and finish counting. Sit in a chair or lie on your left side. Place your hands on the largest part of your belly. Count until you reach 10 kicks. Write down how much time it takes to count 10 kicks. It may take 30 minutes to 2 hours to count 10 kicks. It should not take more than 2 hours to count 10 kicks. Follow up with your doctor as directed:  Write down your questions so you remember to ask them during your visits. © Copyright [employer] 2022 Information is for End User's use only and may not be sold, redistributed or otherwise used for commercial purposes. The above information is an  only. It is not intended as medical advice for individual conditions or treatments. Talk to your doctor, nurse or pharmacist before following any medical regimen to see if it is safe and effective for you.

## 2023-07-10 NOTE — LETTER
July 10, 2023     Laure Goodell, MD  [address]  [address] [id number]    Patient: Deidre Meraz   YOB: 1990   Date of Visit: 7/10/2023       Dear Dr. Annemarie Dos Santos: Thank you for referring Deidre Meraz to me for evaluation. Below are my notes for this consultation. If you have questions, please do not hesitate to call me. I look forward to following your patient along with you.          Sincerely,         US 2 UPPER PERK        CC: No Recipients    Lyndon Kenney MD  2023 11:32 AM  Sign when Signing Visit  Please refer to the [facility] ultrasound report in Ob Procedures for additional information regarding today's visit

## 2023-07-19 ENCOUNTER — ROUTINE PRENATAL (OUTPATIENT)
Dept: OBGYN CLINIC | Facility: CLINIC | Age: 33
End: 2023-07-19
Payer: COMMERCIAL

## 2023-07-19 VITALS
HEIGHT: 65 IN | DIASTOLIC BLOOD PRESSURE: 58 MMHG | SYSTOLIC BLOOD PRESSURE: 90 MMHG | BODY MASS INDEX: 27.19 KG/M2 | WEIGHT: 163.2 LBS

## 2023-07-19 DIAGNOSIS — Z3A.33 33 WEEKS GESTATION OF PREGNANCY: Primary | ICD-10-CM

## 2023-07-19 DIAGNOSIS — O09.899 HIGH RISK PREGNANCY WITH HIGH INHIBIN: ICD-10-CM

## 2023-07-19 DIAGNOSIS — O28.8 HIGH RISK PREGNANCY WITH HIGH INHIBIN: ICD-10-CM

## 2023-07-19 DIAGNOSIS — Z86.69 MATERNAL MIGRAINE, HISTORY OF: ICD-10-CM

## 2023-07-19 DIAGNOSIS — O99.013 ANEMIA AFFECTING PREGNANCY IN THIRD TRIMESTER: ICD-10-CM

## 2023-07-19 DIAGNOSIS — Z23 NEED FOR TDAP VACCINATION: ICD-10-CM

## 2023-07-19 DIAGNOSIS — Z87.59 MATERNAL MIGRAINE, HISTORY OF: ICD-10-CM

## 2023-07-19 PROCEDURE — PNV: Performed by: OBSTETRICS & GYNECOLOGY

## 2023-07-19 PROCEDURE — 90471 IMMUNIZATION ADMIN: CPT | Performed by: OBSTETRICS & GYNECOLOGY

## 2023-07-19 PROCEDURE — 90715 TDAP VACCINE 7 YRS/> IM: CPT | Performed by: OBSTETRICS & GYNECOLOGY

## 2023-07-19 NOTE — PROGRESS NOTES
Routine Prenatal Visit  [address]  [address] [facility], [address], [place], 18[address] [facility]    Assessment/Plan:  Samara Neves is a 28y.o. year old  at 33w2d who presents for routine prenatal visit. 1. High risk pregnancy with high inhibin    2. 33 weeks gestation of pregnancy    3. Maternal migraine, history of    4. Anemia affecting pregnancy in third trimester        Next OB Visit 2 weeks. Subjective:     CC: Prenatal care    Rubén Atkins is a 28 y.o.  female who presents for routine prenatal care at 33w2d. Pregnancy ROS: no leakage of fluid, pelvic pain, or vaginal bleeding. normal fetal movement. The following portions of the patient's history were reviewed and updated as appropriate: allergies, current medications, past family history, past medical history, obstetric history, gynecologic history, past social history, past surgical history and problem list.      Objective:  BP 90/58   Ht 5' 5" (1.651 m)   Wt 74 kg (163 lb 3.2 oz)   LMP 2022 (Approximate)   BMI 27.16 kg/m²   Pregravid Weight/BMI: 59.9 kg (132 lb) (BMI 21.97)  Current Weight: 74 kg (163 lb 3.2 oz)   Total Weight Gain: 14.2 kg (31 lb 3.2 oz)   Pre-Alka Vitals    Flowsheet Row Most Recent Value   Prenatal Assessment    Fetal Heart Rate 140   Fundal Height (cm) 33 cm   Movement Present   Prenatal Vitals    Blood Pressure 90/58   Weight - Scale 74 kg (163 lb 3.2 oz)   Urine Albumin/Glucose    Dilation/Effacement/Station    Vaginal Drainage    Draining Fluid No   Edema    LLE Edema Unable to assess   RLE Edema None   Facial Edema None           General: Well appearing, no distress  Abdomen: Soft, gravid, nontender  Extremities: Non tender.

## 2023-08-02 ENCOUNTER — ROUTINE PRENATAL (OUTPATIENT)
Dept: OBGYN CLINIC | Facility: CLINIC | Age: 33
End: 2023-08-02
Payer: COMMERCIAL

## 2023-08-02 VITALS
HEIGHT: 65 IN | SYSTOLIC BLOOD PRESSURE: 110 MMHG | BODY MASS INDEX: 26.66 KG/M2 | DIASTOLIC BLOOD PRESSURE: 60 MMHG | WEIGHT: 160 LBS

## 2023-08-02 DIAGNOSIS — Z86.69 MATERNAL MIGRAINE, HISTORY OF: ICD-10-CM

## 2023-08-02 DIAGNOSIS — O28.8 HIGH RISK PREGNANCY WITH HIGH INHIBIN: ICD-10-CM

## 2023-08-02 DIAGNOSIS — Z87.59 MATERNAL MIGRAINE, HISTORY OF: ICD-10-CM

## 2023-08-02 DIAGNOSIS — O99.013 ANEMIA AFFECTING PREGNANCY IN THIRD TRIMESTER: ICD-10-CM

## 2023-08-02 DIAGNOSIS — O09.899 HIGH RISK PREGNANCY WITH HIGH INHIBIN: ICD-10-CM

## 2023-08-02 DIAGNOSIS — Z3A.35 35 WEEKS GESTATION OF PREGNANCY: Primary | ICD-10-CM

## 2023-08-02 LAB
SL AMB  POCT GLUCOSE, UA: ABNORMAL
SL AMB POCT URINE PROTEIN: ABNORMAL

## 2023-08-02 PROCEDURE — 81002 URINALYSIS NONAUTO W/O SCOPE: CPT | Performed by: OBSTETRICS & GYNECOLOGY

## 2023-08-02 PROCEDURE — PNV: Performed by: OBSTETRICS & GYNECOLOGY

## 2023-08-02 NOTE — PROGRESS NOTES
Routine Prenatal Visit  [address] [place] [address], [address], [facility], [address] [facility],8W    Assessment/Plan:  Kumar Catalan is a 28y.o. year old  at 35w2d who presents for routine prenatal visit. 1. 35 weeks gestation of pregnancy  -     POCT urine dip    2. High risk pregnancy with high inhibin    3. Maternal migraine, history of    4. Anemia affecting pregnancy in third trimester        Next OB Visit 1 weeks. Subjective:     CC: Prenatal care    Geno Irene is a 28 y.o.  female who presents for routine prenatal care at 35w2d. Pregnancy ROS: no leakage of fluid, pelvic pain, or vaginal bleeding. normal fetal movement. The following portions of the patient's history were reviewed and updated as appropriate: allergies, current medications, past family history, past medical history, obstetric history, gynecologic history, past social history, past surgical history and problem list.      Objective:  /60 (BP Location: Left arm, Patient Position: Sitting, Cuff Size: Standard)   Ht 5' 5" (1.651 m)   Wt 72.6 kg (160 lb)   LMP 2022 (Approximate)   BMI 26.63 kg/m²   Pregravid Weight/BMI: 59.9 kg (132 lb) (BMI 21.97)  Current Weight: 72.6 kg (160 lb)   Total Weight Gain: 12.7 kg (28 lb)   Pre-Alka Vitals    Flowsheet Row Most Recent Value   Prenatal Assessment    Fetal Heart Rate 140   Fundal Height (cm) 35 cm   Movement Present   Prenatal Vitals    Blood Pressure 110/60   Weight - Scale 72.6 kg (160 lb)   Urine Albumin/Glucose    Dilation/Effacement/Station    Vaginal Drainage    Draining Fluid No   Edema            General: Well appearing, no distress  Abdomen: Soft, gravid, nontender  Extremities: Non tender.

## 2023-08-09 ENCOUNTER — ROUTINE PRENATAL (OUTPATIENT)
Dept: OBGYN CLINIC | Facility: CLINIC | Age: 33
End: 2023-08-09
Payer: COMMERCIAL

## 2023-08-09 VITALS
WEIGHT: 164 LBS | SYSTOLIC BLOOD PRESSURE: 104 MMHG | DIASTOLIC BLOOD PRESSURE: 64 MMHG | BODY MASS INDEX: 27.32 KG/M2 | HEIGHT: 65 IN

## 2023-08-09 DIAGNOSIS — Z87.59 MATERNAL MIGRAINE, HISTORY OF: ICD-10-CM

## 2023-08-09 DIAGNOSIS — Z36.85 ENCOUNTER FOR ANTENATAL SCREENING FOR STREPTOCOCCUS B: ICD-10-CM

## 2023-08-09 DIAGNOSIS — O09.899 HIGH RISK PREGNANCY WITH HIGH INHIBIN: ICD-10-CM

## 2023-08-09 DIAGNOSIS — Z86.69 MATERNAL MIGRAINE, HISTORY OF: ICD-10-CM

## 2023-08-09 DIAGNOSIS — O99.013 ANEMIA AFFECTING PREGNANCY IN THIRD TRIMESTER: ICD-10-CM

## 2023-08-09 DIAGNOSIS — Z3A.36 36 WEEKS GESTATION OF PREGNANCY: Primary | ICD-10-CM

## 2023-08-09 DIAGNOSIS — O28.8 HIGH RISK PREGNANCY WITH HIGH INHIBIN: ICD-10-CM

## 2023-08-09 LAB
SL AMB  POCT GLUCOSE, UA: ABNORMAL
SL AMB POCT URINE PROTEIN: ABNORMAL

## 2023-08-09 PROCEDURE — 81002 URINALYSIS NONAUTO W/O SCOPE: CPT | Performed by: OBSTETRICS & GYNECOLOGY

## 2023-08-09 PROCEDURE — PNV: Performed by: OBSTETRICS & GYNECOLOGY

## 2023-08-09 NOTE — PROGRESS NOTES
Routine Prenatal Visit  [address]  [address] [facility], [address], [place], [address] [facility]    Assessment/Plan:  Farida Last is a 28y.o. year old  at 36w2d who presents for routine prenatal visit. 1. 36 weeks gestation of pregnancy  -     POCT urine dip    2. Encounter for  screening for Streptococcus B  -     Strep Gp B NATALI+Rflx    3. High risk pregnancy with high inhibin    4. Maternal migraine, history of    5. Anemia affecting pregnancy in third trimester        Next OB Visit 1 weeks. Subjective:     CC: Prenatal care    Vito Tamez is a 28 y.o.  female who presents for routine prenatal care at 36w2d. Pregnancy ROS: no leakage of fluid, pelvic pain, or vaginal bleeding. normal fetal movement. The following portions of the patient's history were reviewed and updated as appropriate: allergies, current medications, past family history, past medical history, obstetric history, gynecologic history, past social history, past surgical history and problem list.      Objective:  /64 (BP Location: Right arm, Patient Position: Sitting, Cuff Size: Standard)   Ht 5' 5" (1.651 m)   Wt 74.4 kg (164 lb)   LMP 2022 (Approximate)   BMI 27.29 kg/m²   Pregravid Weight/BMI: 59.9 kg (132 lb) (BMI 21.97)  Current Weight: 74.4 kg (164 lb)   Total Weight Gain: 14.5 kg (32 lb)   Pre- Vitals    Flowsheet Row Most Recent Value   Prenatal Assessment    Fetal Heart Rate 140   Fundal Height (cm) 36 cm   Movement Present   Prenatal Vitals    Blood Pressure 104/64   Weight - Scale 74.4 kg (164 lb)   Urine Albumin/Glucose    Dilation/Effacement/Station    Cervical Dilation 0   Cervical Effacement 0   Fetal Station -3   Vaginal Drainage    Draining Fluid No   Edema            General: Well appearing, no distress  Abdomen: Soft, gravid, nontender  Extremities: Non tender.

## 2023-08-10 NOTE — PROGRESS NOTES
Please refer to the [facility] ultrasound report in Ob Procedures for additional information regarding today's visit

## 2023-08-11 ENCOUNTER — ULTRASOUND (OUTPATIENT)
Dept: PERINATAL CARE | Facility: OTHER | Age: 33
End: 2023-08-11
Payer: COMMERCIAL

## 2023-08-11 VITALS
WEIGHT: 164 LBS | BODY MASS INDEX: 27.32 KG/M2 | HEIGHT: 65 IN | HEART RATE: 95 BPM | DIASTOLIC BLOOD PRESSURE: 56 MMHG | SYSTOLIC BLOOD PRESSURE: 98 MMHG

## 2023-08-11 DIAGNOSIS — O09.899 HIGH RISK PREGNANCY WITH HIGH INHIBIN: ICD-10-CM

## 2023-08-11 DIAGNOSIS — Z3A.36 36 WEEKS GESTATION OF PREGNANCY: Primary | ICD-10-CM

## 2023-08-11 DIAGNOSIS — O28.8 HIGH RISK PREGNANCY WITH HIGH INHIBIN: ICD-10-CM

## 2023-08-11 LAB — GP B STREP DNA SPEC QL NAA+PROBE: NEGATIVE

## 2023-08-11 PROCEDURE — 76815 OB US LIMITED FETUS(S): CPT | Performed by: OBSTETRICS & GYNECOLOGY

## 2023-08-11 PROCEDURE — 59025 FETAL NON-STRESS TEST: CPT | Performed by: OBSTETRICS & GYNECOLOGY

## 2023-08-11 NOTE — PROGRESS NOTES
Repeat Non-Stress Testing:    Patient verbalizes +FM. Pt denies ALL:               Leaking of fluid   Contractions   Vaginal bleeding   Decreased fetal movement    Patient is performing daily kick counts. Patient has no questions or concerns. NST strip reviewed by Dr. Jose Elias Douglas.

## 2023-08-11 NOTE — LETTER
NST sleeve cover sheet    Patient name: Barrington Vee  : 1990  MRN: [id number]    KATHRYN: Estimated Date of Delivery: 23    Obstetrician: ___________[facility]__________________    Reason(s) for testing:  _____________________________________      Testing frequency:    ___ 2x/wk  ___ 1x/wk  ___ Dopplers  ___ BPP?       Last growth scan: __________________________________________

## 2023-08-11 NOTE — LETTER
August 11, 2023     Naya MarkhamDO  [address] [facility]    Patient: Radha Neely   YOB: 1990   Date of Visit: 8/11/2023       Dear Dr. Sadi Bojorquez: Thank you for referring Radha Neely to me for evaluation. Below are my notes for this consultation. If you have questions, please do not hesitate to call me. I look forward to following your patient along with you.          Sincerely,        Karen Weldon MD        CC: No Recipients    Karen Weldon MD  8/10/2023  7:44 AM  Sign when Signing Visit  Please refer to the [facility] ultrasound report in Ob Procedures for additional information regarding today's visit

## 2023-08-16 ENCOUNTER — ROUTINE PRENATAL (OUTPATIENT)
Dept: OBGYN CLINIC | Facility: CLINIC | Age: 33
End: 2023-08-16
Payer: COMMERCIAL

## 2023-08-16 VITALS
WEIGHT: 165.2 LBS | SYSTOLIC BLOOD PRESSURE: 98 MMHG | BODY MASS INDEX: 27.52 KG/M2 | DIASTOLIC BLOOD PRESSURE: 60 MMHG | HEIGHT: 65 IN

## 2023-08-16 DIAGNOSIS — Z3A.37 37 WEEKS GESTATION OF PREGNANCY: Primary | ICD-10-CM

## 2023-08-16 DIAGNOSIS — Z86.69 MATERNAL MIGRAINE, HISTORY OF: ICD-10-CM

## 2023-08-16 DIAGNOSIS — Z87.59 MATERNAL MIGRAINE, HISTORY OF: ICD-10-CM

## 2023-08-16 DIAGNOSIS — O28.8 HIGH RISK PREGNANCY WITH HIGH INHIBIN: ICD-10-CM

## 2023-08-16 DIAGNOSIS — O09.899 HIGH RISK PREGNANCY WITH HIGH INHIBIN: ICD-10-CM

## 2023-08-16 DIAGNOSIS — O99.013 ANEMIA AFFECTING PREGNANCY IN THIRD TRIMESTER: ICD-10-CM

## 2023-08-16 LAB
DME PARACHUTE DELIVERY DATE ACTUAL: NORMAL
DME PARACHUTE DELIVERY DATE REQUESTED: NORMAL
DME PARACHUTE ITEM DESCRIPTION: NORMAL
DME PARACHUTE ORDER STATUS: NORMAL
DME PARACHUTE SUPPLIER NAME: NORMAL
DME PARACHUTE SUPPLIER PHONE: NORMAL
SL AMB  POCT GLUCOSE, UA: NEGATIVE
SL AMB POCT URINE PROTEIN: NEGATIVE

## 2023-08-16 PROCEDURE — 81002 URINALYSIS NONAUTO W/O SCOPE: CPT | Performed by: OBSTETRICS & GYNECOLOGY

## 2023-08-16 PROCEDURE — PNV: Performed by: OBSTETRICS & GYNECOLOGY

## 2023-08-16 NOTE — PROGRESS NOTES
Routine Prenatal Visit  [address]  800 [facility], [address], [place], [address] [facility]    Assessment/Plan:  Mann Bell is a 28y.o. year old  at 37w2d who presents for routine prenatal visit. 1. 37 weeks gestation of pregnancy  -     POCT urine dip    2. High risk pregnancy with high inhibin    3. Maternal migraine, history of    4. Anemia affecting pregnancy in third trimester    Next OB Visit 1 weeks. Subjective:     CC: Prenatal care    Juan M Manley is a 28 y.o.  female who presents for routine prenatal care at 37w2d. Pregnancy ROS: no leakage of fluid, pelvic pain, or vaginal bleeding. normal fetal movement. The following portions of the patient's history were reviewed and updated as appropriate: allergies, current medications, past family history, past medical history, obstetric history, gynecologic history, past social history, past surgical history and problem list.      Objective:  BP 98/60   Ht 5' 5" (1.651 m)   Wt 74.9 kg (165 lb 3.2 oz)   LMP 2022 (Approximate)   BMI 27.49 kg/m²   Pregravid Weight/BMI: 59.9 kg (132 lb) (BMI 21.97)  Current Weight: 74.9 kg (165 lb 3.2 oz)   Total Weight Gain: 15.1 kg (33 lb 3.2 oz)   Pre-Alka Vitals    Flowsheet Row Most Recent Value   Prenatal Assessment    Fetal Heart Rate 140   Fundal Height (cm) 37 cm   Movement Present   Prenatal Vitals    Blood Pressure 98/60   Weight - Scale 74.9 kg (165 lb 3.2 oz)   Urine Albumin/Glucose    Dilation/Effacement/Station    Vaginal Drainage    Draining Fluid No   Edema    LLE Edema None   RLE Edema None   Facial Edema None           General: Well appearing, no distress  Abdomen: Soft, gravid, nontender  Extremities: Non tender.

## 2023-08-18 ENCOUNTER — ULTRASOUND (OUTPATIENT)
Dept: PERINATAL CARE | Facility: OTHER | Age: 33
End: 2023-08-18
Payer: COMMERCIAL

## 2023-08-18 VITALS
HEART RATE: 84 BPM | HEIGHT: 65 IN | DIASTOLIC BLOOD PRESSURE: 62 MMHG | WEIGHT: 166.8 LBS | SYSTOLIC BLOOD PRESSURE: 100 MMHG | BODY MASS INDEX: 27.79 KG/M2

## 2023-08-18 DIAGNOSIS — Z3A.37 37 WEEKS GESTATION OF PREGNANCY: ICD-10-CM

## 2023-08-18 DIAGNOSIS — O09.899 HIGH RISK PREGNANCY WITH HIGH INHIBIN: Primary | ICD-10-CM

## 2023-08-18 DIAGNOSIS — O28.8 HIGH RISK PREGNANCY WITH HIGH INHIBIN: Primary | ICD-10-CM

## 2023-08-18 PROCEDURE — 59025 FETAL NON-STRESS TEST: CPT | Performed by: STUDENT IN AN ORGANIZED HEALTH CARE EDUCATION/TRAINING PROGRAM

## 2023-08-18 PROCEDURE — 76815 OB US LIMITED FETUS(S): CPT | Performed by: STUDENT IN AN ORGANIZED HEALTH CARE EDUCATION/TRAINING PROGRAM

## 2023-08-18 NOTE — PROGRESS NOTES
[address] [facility]: Ms. Ras Miller was seen today for NST (found under the pregnancy episode) which I reviewed the RN assessment and agree, and IZA (see ultrasound report under OB procedures tab). The time spent on this established patient on the encounter date included 5 minutes previsit service time reviewing records and precharting, 5 minutes face-to-face service time counseling regarding results and coordinating care, and  5 minutes charting, totalling 15 minutes. Please don't hesitate to contact our office with any concerns or questions.   -Wilfrid Beth MD

## 2023-08-21 PROBLEM — Z3A.38 38 WEEKS GESTATION OF PREGNANCY: Status: ACTIVE | Noted: 2023-04-20

## 2023-08-22 NOTE — PATIENT INSTRUCTIONS
- Please call office if leaking of fluid or bleeding.  - You may be in labor if you are having regular contractions for > 1 hour (lasting 1 minute, every 5 minutes, becoming more painful for over time, and do not decrease with rest and drinking 2 glasses of water or other fluid). - Please call if you feel a significant decrease in fetal movement and during fetal kick counts the baby does not move 10 times in 2 hours.

## 2023-08-23 ENCOUNTER — ROUTINE PRENATAL (OUTPATIENT)
Dept: OBGYN CLINIC | Facility: CLINIC | Age: 33
End: 2023-08-23
Payer: COMMERCIAL

## 2023-08-23 VITALS — DIASTOLIC BLOOD PRESSURE: 60 MMHG | SYSTOLIC BLOOD PRESSURE: 100 MMHG | WEIGHT: 166 LBS | BODY MASS INDEX: 27.62 KG/M2

## 2023-08-23 DIAGNOSIS — O28.8 HIGH RISK PREGNANCY WITH HIGH INHIBIN: Primary | ICD-10-CM

## 2023-08-23 DIAGNOSIS — O09.899 HIGH RISK PREGNANCY WITH HIGH INHIBIN: Primary | ICD-10-CM

## 2023-08-23 DIAGNOSIS — Z3A.38 38 WEEKS GESTATION OF PREGNANCY: ICD-10-CM

## 2023-08-23 LAB
SL AMB  POCT GLUCOSE, UA: NORMAL
SL AMB POCT URINE PROTEIN: NORMAL

## 2023-08-23 PROCEDURE — 81002 URINALYSIS NONAUTO W/O SCOPE: CPT | Performed by: OBSTETRICS & GYNECOLOGY

## 2023-08-23 PROCEDURE — PNV: Performed by: OBSTETRICS & GYNECOLOGY

## 2023-08-23 NOTE — PROGRESS NOTES
Routine Prenatal Visit  [address] [place] [address], [address], [facility], [address] [facility],8W    Assessment/Plan:  Lea Jim is a 28y.o. year old  at 38w2d who presents for routine prenatal visit. 1. High risk pregnancy with high inhibin  Assessment & Plan:  Continue weekly  testing until delivery. 2. 38 weeks gestation of pregnancy  -     POCT urine dip        Next OB Visit 1 weeks. Subjective:     CC: Prenatal care    Hoang Landaverde is a 28 y.o.  female who presents for routine prenatal care at 38w2d. Pregnancy ROS: no leakage of fluid, pelvic pain, or vaginal bleeding. normal fetal movement. The following portions of the patient's history were reviewed and updated as appropriate: allergies, current medications, past family history, past medical history, obstetric history, gynecologic history, past social history, past surgical history and problem list.      Objective:  /60   Wt 75.3 kg (166 lb)   LMP 2022 (Approximate)   BMI 27.62 kg/m²   Pregravid Weight/BMI: 59.9 kg (132 lb) (BMI 21.97)  Current Weight: 75.3 kg (166 lb)   Total Weight Gain: 15.4 kg (34 lb)   Pre- Vitals    Flowsheet Row Most Recent Value   Prenatal Assessment    Fetal Heart Rate 130   Fundal Height (cm) 38 cm   Movement Present   Presentation Vertex   Prenatal Vitals    Blood Pressure 100/60   Weight - Scale 75.3 kg (166 lb)   Urine Albumin/Glucose    Dilation/Effacement/Station    Cervical Dilation 1.5   Cervical Effacement 0   Fetal Station -3   Vaginal Drainage    Draining Fluid No   Edema    LLE Edema None   RLE Edema None           General: Well appearing, no distress  Abdomen: Soft, gravid, nontender  Extremities: Non tender.

## 2023-08-25 ENCOUNTER — ULTRASOUND (OUTPATIENT)
Dept: PERINATAL CARE | Facility: OTHER | Age: 33
End: 2023-08-25
Payer: COMMERCIAL

## 2023-08-25 VITALS
HEART RATE: 100 BPM | SYSTOLIC BLOOD PRESSURE: 100 MMHG | DIASTOLIC BLOOD PRESSURE: 62 MMHG | BODY MASS INDEX: 27.86 KG/M2 | WEIGHT: 167.2 LBS | HEIGHT: 65 IN

## 2023-08-25 DIAGNOSIS — O28.8 HIGH RISK PREGNANCY WITH HIGH INHIBIN: Primary | ICD-10-CM

## 2023-08-25 DIAGNOSIS — O35.DXX0 PREGNANCY COMPLICATED BY FETAL GASTROINTESTINAL ABNORMALITY, SINGLE OR UNSPECIFIED FETUS: ICD-10-CM

## 2023-08-25 DIAGNOSIS — Z3A.38 38 WEEKS GESTATION OF PREGNANCY: ICD-10-CM

## 2023-08-25 DIAGNOSIS — O09.899 HIGH RISK PREGNANCY WITH HIGH INHIBIN: Primary | ICD-10-CM

## 2023-08-25 PROCEDURE — 76818 FETAL BIOPHYS PROFILE W/NST: CPT | Performed by: OBSTETRICS & GYNECOLOGY

## 2023-08-29 ENCOUNTER — ROUTINE PRENATAL (OUTPATIENT)
Dept: OBGYN CLINIC | Facility: CLINIC | Age: 33
End: 2023-08-29
Payer: COMMERCIAL

## 2023-08-29 VITALS
WEIGHT: 167.2 LBS | BODY MASS INDEX: 27.86 KG/M2 | SYSTOLIC BLOOD PRESSURE: 102 MMHG | HEIGHT: 65 IN | DIASTOLIC BLOOD PRESSURE: 60 MMHG

## 2023-08-29 DIAGNOSIS — Z3A.39 39 WEEKS GESTATION OF PREGNANCY: ICD-10-CM

## 2023-08-29 DIAGNOSIS — O99.013 ANEMIA AFFECTING PREGNANCY IN THIRD TRIMESTER: ICD-10-CM

## 2023-08-29 DIAGNOSIS — O35.DXX0 PREGNANCY COMPLICATED BY FETAL GASTROINTESTINAL ABNORMALITY, SINGLE OR UNSPECIFIED FETUS: Primary | ICD-10-CM

## 2023-08-29 LAB
SL AMB  POCT GLUCOSE, UA: NEGATIVE
SL AMB POCT URINE PROTEIN: NEGATIVE

## 2023-08-29 PROCEDURE — PNV: Performed by: OBSTETRICS & GYNECOLOGY

## 2023-08-29 PROCEDURE — 81002 URINALYSIS NONAUTO W/O SCOPE: CPT | Performed by: OBSTETRICS & GYNECOLOGY

## 2023-08-29 NOTE — PROGRESS NOTES
Routine Prenatal Visit  [address] [place] [address], [address], [facility], [address] [facility],8W    Assessment/Plan:  Darien Boswell is a 28y.o. year old  at 39w1d who presents for routine prenatal visit. 1. Pregnancy complicated by fetal gastrointestinal abnormality, single or unspecified fetus  Assessment & Plan:  Dilated fetal bowel at 1.56cm at 38 weeks.  close inspection of GI anatomy and function is recommended. 2. Anemia affecting pregnancy in third trimester    3. 39 weeks gestation of pregnancy  Assessment & Plan:  No desire for labor induction at this time. Will reexamine cervix next week and discuss options. Orders:  -     POCT urine dip        Subjective:     CC: Prenatal care    Jovana Goldstein is a 28 y.o.  female who presents for routine prenatal care at 39w1d. Pregnancy ROS: no leakage of fluid, pelvic pain, or vaginal bleeding. normal fetal movement.     The following portions of the patient's history were reviewed and updated as appropriate: allergies, current medications, past family history, past medical history, obstetric history, gynecologic history, past social history, past surgical history and problem list.      Objective:  /60   Ht 5' 5" (1.651 m)   Wt 75.8 kg (167 lb 3.2 oz)   LMP 2022 (Approximate)   BMI 27.82 kg/m²   Pregravid Weight/BMI: 59.9 kg (132 lb) (BMI 21.97)  Current Weight: 75.8 kg (167 lb 3.2 oz)   Total Weight Gain: 16 kg (35 lb 3.2 oz)   Pre- Vitals    Flowsheet Row Most Recent Value   Prenatal Assessment    Fetal Heart Rate 150   Fundal Height (cm) 39 cm   Movement Present   Presentation Vertex   Prenatal Vitals    Blood Pressure 102/60   Weight - Scale 75.8 kg (167 lb 3.2 oz)   Urine Albumin/Glucose    Dilation/Effacement/Station    Cervical Dilation 1.5   Cervical Effacement 40   Fetal Station -3   Vaginal Drainage    Edema    LLE Edema None   RLE Edema None   Facial Edema None           General: Well appearing, no distress  Respiratory: Unlabored breathing  Cardiovascular: Regular rate. Abdomen: Soft, gravid, nontender  Fundal Height: Appropriate for gestational age. Extremities: Warm and well perfused. Non tender.

## 2023-08-29 NOTE — ASSESSMENT & PLAN NOTE
Dilated fetal bowel at 1.56cm at 38 weeks.  close inspection of GI anatomy and function is recommended.

## 2023-09-01 ENCOUNTER — ULTRASOUND (OUTPATIENT)
Dept: PERINATAL CARE | Facility: OTHER | Age: 33
End: 2023-09-01
Payer: COMMERCIAL

## 2023-09-01 VITALS
DIASTOLIC BLOOD PRESSURE: 78 MMHG | SYSTOLIC BLOOD PRESSURE: 102 MMHG | WEIGHT: 167 LBS | HEIGHT: 65 IN | BODY MASS INDEX: 27.82 KG/M2 | HEART RATE: 94 BPM

## 2023-09-01 DIAGNOSIS — O28.8 HIGH RISK PREGNANCY WITH HIGH INHIBIN: Primary | ICD-10-CM

## 2023-09-01 DIAGNOSIS — Z3A.39 39 WEEKS GESTATION OF PREGNANCY: ICD-10-CM

## 2023-09-01 DIAGNOSIS — O09.899 HIGH RISK PREGNANCY WITH HIGH INHIBIN: Primary | ICD-10-CM

## 2023-09-01 PROCEDURE — 76818 FETAL BIOPHYS PROFILE W/NST: CPT | Performed by: OBSTETRICS & GYNECOLOGY

## 2023-09-01 NOTE — PATIENT INSTRUCTIONS
Thank you for choosing us for your  care today. If you have any questions about your ultrasound or care, please do not hesitate to contact us or your primary obstetrician. Some general instructions for your pregnancy are:    Exercise: Aim for 22 minutes per day (150 minutes per week) of regular exercise. Walking is great! Nutrition: Choose healthy sources of calcium, iron, and protein. Learn about Preeclampsia: preeclampsia is a common, serious high blood pressure complication in pregnancy. A blood pressure of 511VFOG (systolic or top number) or 91CJOG (diastolic or bottom number) is not normal and needs evaluation by your doctor. Aspirin is sometimes prescribed in early pregnancy to prevent preeclampsia in women with risk factors - ask your obstetrician if you should be on this medication. For more resources, visit:  MapCoverage.fi  If you smoke, try to reduce how many cigarettes you smoke or try to quit completely. Do not vape. Other warning signs to watch out for in pregnancy or postpartum: chest pain, obstructed breathing or shortness of breath, seizures, thoughts of hurting yourself or your baby, bleeding, a painful or swollen leg, fever, or headache (see [employer] POST-BIRTH Warning Signs campaign). If these happen call 911. Itching is also not normal in pregnancy and if you experience this, especially over your hands and feet, potentially worse at night, notify your doctors.

## 2023-09-01 NOTE — PROGRESS NOTES
[address] [facility]: Ms. Mayco Tamez was seen today for NST (found under the pregnancy episode) which I reviewed the RN assessment and agree, and IZA (see ultrasound report under OB procedures tab). Please don't hesitate to contact our office with any concerns or questions.   Ej Duff MD

## 2023-09-01 NOTE — PROGRESS NOTES
Repeat Non-Stress Testing:    Patient verbalizes +FM. Pt denies ALL:               Leaking of fluid   Contractions   Vaginal bleeding   Decreased fetal movement    Patient is performing daily kick counts. Patient has no questions or concerns. NST strip reviewed by Jaspreet Cr.

## 2023-09-04 ENCOUNTER — ANESTHESIA (INPATIENT)
Dept: ANESTHESIOLOGY | Facility: HOSPITAL | Age: 33
End: 2023-09-04
Payer: COMMERCIAL

## 2023-09-04 ENCOUNTER — HOSPITAL ENCOUNTER (INPATIENT)
Facility: HOSPITAL | Age: 33
LOS: 2 days | Discharge: HOME | End: 2023-09-06
Attending: OBSTETRICS & GYNECOLOGY | Admitting: OBSTETRICS & GYNECOLOGY
Payer: COMMERCIAL

## 2023-09-04 ENCOUNTER — ANESTHESIA EVENT (INPATIENT)
Dept: ANESTHESIOLOGY | Facility: HOSPITAL | Age: 33
End: 2023-09-04
Payer: COMMERCIAL

## 2023-09-04 LAB
ABO GROUP BLD: NORMAL
ALBUMIN SERPL BCP-MCNC: 4 G/DL (ref 3.5–5)
ALP SERPL-CCNC: 118 U/L (ref 34–104)
ALT SERPL W P-5'-P-CCNC: 14 U/L (ref 7–52)
ANION GAP SERPL CALCULATED.3IONS-SCNC: 6 MMOL/L
AST SERPL W P-5'-P-CCNC: 20 U/L (ref 13–39)
BASE EXCESS BLDCOA CALC-SCNC: -0.7 MMOL/L (ref 3–11)
BASE EXCESS BLDCOV CALC-SCNC: -3.7 MMOL/L (ref 1–9)
BASOPHILS # BLD AUTO: 0.06 THOUSANDS/ÂΜL (ref 0–0.1)
BASOPHILS NFR BLD AUTO: 0 % (ref 0–1)
BILIRUB SERPL-MCNC: 0.28 MG/DL (ref 0.2–1)
BLD GP AB SCN SERPL QL: NEGATIVE
BUN SERPL-MCNC: 9 MG/DL (ref 5–25)
CALCIUM SERPL-MCNC: 8.7 MG/DL (ref 8.4–10.2)
CHLORIDE SERPL-SCNC: 103 MMOL/L (ref 96–108)
CO2 SERPL-SCNC: 24 MMOL/L (ref 21–32)
CREAT SERPL-MCNC: 0.64 MG/DL (ref 0.6–1.3)
EOSINOPHIL # BLD AUTO: 0.1 THOUSAND/ÂΜL (ref 0–0.61)
EOSINOPHIL NFR BLD AUTO: 1 % (ref 0–6)
ERYTHROCYTE [DISTWIDTH] IN BLOOD BY AUTOMATED COUNT: 13.7 % (ref 11.6–15.1)
GFR SERPL CREATININE-BSD FRML MDRD: 116 ML/MIN/1.73SQ M
GLUCOSE SERPL-MCNC: 102 MG/DL (ref 65–140)
HCO3 BLDCOA-SCNC: 25.6 MMOL/L (ref 17.3–27.3)
HCO3 BLDCOV-SCNC: 20.3 MMOL/L (ref 12.2–28.6)
HCT VFR BLD AUTO: 37.9 % (ref 34.8–46.1)
HGB BLD-MCNC: 12.5 G/DL (ref 11.5–15.4)
IMM GRANULOCYTES # BLD AUTO: 0.13 THOUSAND/UL (ref 0–0.2)
IMM GRANULOCYTES NFR BLD AUTO: 1 % (ref 0–2)
LYMPHOCYTES # BLD AUTO: 2.54 THOUSANDS/ÂΜL (ref 0.6–4.47)
LYMPHOCYTES NFR BLD AUTO: 15 % (ref 14–44)
MCH RBC QN AUTO: 29.4 PG (ref 26.8–34.3)
MCHC RBC AUTO-ENTMCNC: 33 G/DL (ref 31.4–37.4)
MCV RBC AUTO: 89 FL (ref 82–98)
MONOCYTES # BLD AUTO: 1.02 THOUSAND/ÂΜL (ref 0.17–1.22)
MONOCYTES NFR BLD AUTO: 6 % (ref 4–12)
NEUTROPHILS # BLD AUTO: 13.45 THOUSANDS/ÂΜL (ref 1.85–7.62)
NEUTS SEG NFR BLD AUTO: 77 % (ref 43–75)
NRBC BLD AUTO-RTO: 0 /100 WBCS
O2 CT VFR BLDCOA CALC: 7.5 ML/DL
OXYHGB MFR BLDCOA: 37.4 %
OXYHGB MFR BLDCOV: 77.5 %
PCO2 BLDCOA: 48.5 MM[HG] (ref 30–60)
PCO2 BLDCOV: 33.7 MM HG (ref 27–43)
PH BLDCOA: 7.34 [PH] (ref 7.23–7.43)
PH BLDCOV: 7.4 [PH] (ref 7.19–7.49)
PLATELET # BLD AUTO: 235 THOUSANDS/UL (ref 149–390)
PMV BLD AUTO: 12.8 FL (ref 8.9–12.7)
PO2 BLDCOA: 18.1 MM HG (ref 5–25)
PO2 BLDCOV: 36.4 MM HG (ref 15–45)
POTASSIUM SERPL-SCNC: 3.7 MMOL/L (ref 3.5–5.3)
PROT SERPL-MCNC: 7.3 G/DL (ref 6.4–8.4)
RBC # BLD AUTO: 4.25 MILLION/UL (ref 3.81–5.12)
RH BLD: POSITIVE
SAO2 % BLDCOV: 15 ML/DL
SODIUM SERPL-SCNC: 133 MMOL/L (ref 135–147)
SPECIMEN EXPIRATION DATE: NORMAL
TREPONEMA PALLIDUM IGG+IGM AB [PRESENCE] IN SERUM OR PLASMA BY IMMUNOASSAY: NORMAL
WBC # BLD AUTO: 17.3 THOUSAND/UL (ref 4.31–10.16)

## 2023-09-04 PROCEDURE — 86901 BLOOD TYPING SEROLOGIC RH(D): CPT | Performed by: OBSTETRICS & GYNECOLOGY

## 2023-09-04 PROCEDURE — 86850 RBC ANTIBODY SCREEN: CPT | Performed by: OBSTETRICS & GYNECOLOGY

## 2023-09-04 PROCEDURE — 85025 COMPLETE CBC W/AUTO DIFF WBC: CPT | Performed by: OBSTETRICS & GYNECOLOGY

## 2023-09-04 PROCEDURE — 86900 BLOOD TYPING SEROLOGIC ABO: CPT | Performed by: OBSTETRICS & GYNECOLOGY

## 2023-09-04 PROCEDURE — G0463 HOSPITAL OUTPT CLINIC VISIT: HCPCS

## 2023-09-04 PROCEDURE — 59400 OBSTETRICAL CARE: CPT | Performed by: OBSTETRICS & GYNECOLOGY

## 2023-09-04 PROCEDURE — 4A1HXCZ MONITORING OF PRODUCTS OF CONCEPTION, CARDIAC RATE, EXTERNAL APPROACH: ICD-10-PCS | Performed by: OBSTETRICS & GYNECOLOGY

## 2023-09-04 PROCEDURE — 86780 TREPONEMA PALLIDUM: CPT | Performed by: OBSTETRICS & GYNECOLOGY

## 2023-09-04 PROCEDURE — NC001 PR NO CHARGE: Performed by: OBSTETRICS & GYNECOLOGY

## 2023-09-04 PROCEDURE — 82805 BLOOD GASES W/O2 SATURATION: CPT | Performed by: OBSTETRICS & GYNECOLOGY

## 2023-09-04 PROCEDURE — 99212 OFFICE O/P EST SF 10 MIN: CPT

## 2023-09-04 PROCEDURE — 80053 COMPREHEN METABOLIC PANEL: CPT | Performed by: OBSTETRICS & GYNECOLOGY

## 2023-09-04 RX ORDER — ONDANSETRON 2 MG/ML
4 INJECTION INTRAMUSCULAR; INTRAVENOUS EVERY 8 HOURS PRN
Status: DISCONTINUED | OUTPATIENT
Start: 2023-09-04 | End: 2023-09-06 | Stop reason: HOSPADM

## 2023-09-04 RX ORDER — DIAPER,BRIEF,INFANT-TODD,DISP
1 EACH MISCELLANEOUS DAILY PRN
Status: DISCONTINUED | OUTPATIENT
Start: 2023-09-04 | End: 2023-09-06 | Stop reason: HOSPADM

## 2023-09-04 RX ORDER — SODIUM CHLORIDE, SODIUM LACTATE, POTASSIUM CHLORIDE, CALCIUM CHLORIDE 600; 310; 30; 20 MG/100ML; MG/100ML; MG/100ML; MG/100ML
125 INJECTION, SOLUTION INTRAVENOUS CONTINUOUS
Status: DISCONTINUED | OUTPATIENT
Start: 2023-09-04 | End: 2023-09-06 | Stop reason: HOSPADM

## 2023-09-04 RX ORDER — DOCUSATE SODIUM 100 MG/1
100 CAPSULE, LIQUID FILLED ORAL 2 TIMES DAILY
Status: DISCONTINUED | OUTPATIENT
Start: 2023-09-04 | End: 2023-09-06 | Stop reason: HOSPADM

## 2023-09-04 RX ORDER — ONDANSETRON 2 MG/ML
4 INJECTION INTRAMUSCULAR; INTRAVENOUS EVERY 6 HOURS PRN
Status: DISCONTINUED | OUTPATIENT
Start: 2023-09-04 | End: 2023-09-04

## 2023-09-04 RX ORDER — BUPIVACAINE HYDROCHLORIDE 2.5 MG/ML
30 INJECTION, SOLUTION EPIDURAL; INFILTRATION; INTRACAUDAL ONCE AS NEEDED
Status: DISCONTINUED | OUTPATIENT
Start: 2023-09-04 | End: 2023-09-06 | Stop reason: HOSPADM

## 2023-09-04 RX ORDER — OXYTOCIN/RINGER'S LACTATE 30/500 ML
PLASTIC BAG, INJECTION (ML) INTRAVENOUS
Status: COMPLETED
Start: 2023-09-04 | End: 2023-09-04

## 2023-09-04 RX ORDER — ROPIVACAINE HYDROCHLORIDE 2 MG/ML
INJECTION, SOLUTION EPIDURAL; INFILTRATION; PERINEURAL AS NEEDED
Status: DISCONTINUED | OUTPATIENT
Start: 2023-09-04 | End: 2023-09-04 | Stop reason: HOSPADM

## 2023-09-04 RX ORDER — CALCIUM CARBONATE 500 MG/1
1000 TABLET, CHEWABLE ORAL DAILY PRN
Status: DISCONTINUED | OUTPATIENT
Start: 2023-09-04 | End: 2023-09-06 | Stop reason: HOSPADM

## 2023-09-04 RX ORDER — KETOROLAC TROMETHAMINE 30 MG/ML
30 INJECTION, SOLUTION INTRAMUSCULAR; INTRAVENOUS ONCE
Status: COMPLETED | OUTPATIENT
Start: 2023-09-04 | End: 2023-09-04

## 2023-09-04 RX ORDER — OXYCODONE HYDROCHLORIDE 5 MG/1
5 TABLET ORAL
Status: DISCONTINUED | OUTPATIENT
Start: 2023-09-04 | End: 2023-09-06 | Stop reason: HOSPADM

## 2023-09-04 RX ORDER — IBUPROFEN 600 MG/1
600 TABLET ORAL EVERY 6 HOURS
Status: DISCONTINUED | OUTPATIENT
Start: 2023-09-04 | End: 2023-09-06 | Stop reason: HOSPADM

## 2023-09-04 RX ORDER — LIDOCAINE HYDROCHLORIDE AND EPINEPHRINE 15; 5 MG/ML; UG/ML
INJECTION, SOLUTION EPIDURAL AS NEEDED
Status: DISCONTINUED | OUTPATIENT
Start: 2023-09-04 | End: 2023-09-04 | Stop reason: HOSPADM

## 2023-09-04 RX ORDER — ACETAMINOPHEN 325 MG/1
650 TABLET ORAL EVERY 4 HOURS PRN
Status: DISCONTINUED | OUTPATIENT
Start: 2023-09-04 | End: 2023-09-06 | Stop reason: HOSPADM

## 2023-09-04 RX ORDER — LIDOCAINE HYDROCHLORIDE 10 MG/ML
INJECTION, SOLUTION EPIDURAL; INFILTRATION; INTRACAUDAL; PERINEURAL AS NEEDED
Status: DISCONTINUED | OUTPATIENT
Start: 2023-09-04 | End: 2023-09-04 | Stop reason: HOSPADM

## 2023-09-04 RX ADMIN — KETOROLAC TROMETHAMINE 30 MG: 30 INJECTION, SOLUTION INTRAMUSCULAR; INTRAVENOUS at 09:07

## 2023-09-04 RX ADMIN — IBUPROFEN 600 MG: 600 TABLET ORAL at 22:00

## 2023-09-04 RX ADMIN — HYDROCORTISONE 1 APPLICATION: 1 CREAM TOPICAL at 12:30

## 2023-09-04 RX ADMIN — ACETAMINOPHEN 650 MG: 325 TABLET, FILM COATED ORAL at 22:03

## 2023-09-04 RX ADMIN — SODIUM CHLORIDE, SODIUM LACTATE, POTASSIUM CHLORIDE, AND CALCIUM CHLORIDE 999 ML/HR: .6; .31; .03; .02 INJECTION, SOLUTION INTRAVENOUS at 04:40

## 2023-09-04 RX ADMIN — SODIUM CHLORIDE, SODIUM LACTATE, POTASSIUM CHLORIDE, AND CALCIUM CHLORIDE 999 ML/HR: .6; .31; .03; .02 INJECTION, SOLUTION INTRAVENOUS at 03:38

## 2023-09-04 RX ADMIN — BENZOCAINE AND LEVOMENTHOL 1 APPLICATION: 200; 5 SPRAY TOPICAL at 12:30

## 2023-09-04 RX ADMIN — DOCUSATE SODIUM 100 MG: 100 CAPSULE, LIQUID FILLED ORAL at 18:08

## 2023-09-04 RX ADMIN — DOCUSATE SODIUM 100 MG: 100 CAPSULE, LIQUID FILLED ORAL at 12:30

## 2023-09-04 RX ADMIN — WITCH HAZEL 1 PAD: 500 SOLUTION RECTAL; TOPICAL at 12:31

## 2023-09-04 RX ADMIN — ROPIVACAINE HYDROCHLORIDE: 2 INJECTION, SOLUTION EPIDURAL; INFILTRATION at 05:15

## 2023-09-04 RX ADMIN — Medication 250 UNITS: at 09:07

## 2023-09-04 RX ADMIN — IBUPROFEN 600 MG: 600 TABLET ORAL at 16:00

## 2023-09-04 RX ADMIN — LIDOCAINE HYDROCHLORIDE 3 ML: 10 INJECTION, SOLUTION EPIDURAL; INFILTRATION; INTRACAUDAL; PERINEURAL at 04:51

## 2023-09-04 RX ADMIN — ACETAMINOPHEN 650 MG: 325 TABLET, FILM COATED ORAL at 18:08

## 2023-09-04 RX ADMIN — ROPIVACAINE HYDROCHLORIDE 4 ML: 2 INJECTION, SOLUTION EPIDURAL; INFILTRATION at 05:00

## 2023-09-04 RX ADMIN — ROPIVACAINE HYDROCHLORIDE 6 ML: 2 INJECTION, SOLUTION EPIDURAL; INFILTRATION at 04:56

## 2023-09-04 RX ADMIN — LIDOCAINE HYDROCHLORIDE AND EPINEPHRINE 3 ML: 15; 5 INJECTION, SOLUTION EPIDURAL at 04:54

## 2023-09-04 NOTE — OB LABOR/OXYTOCIN SAFETY PROGRESS
Labor Progress Note - Ruth Prader 28 y.o. female MRN: [id number]    Unit/Bed#: -01 Encounter: [id number]       Contraction Frequency (minutes): 3-4  Contraction Quality: Strong  Tachysystole: No   Cervical Dilation: 6        Cervical Effacement: 100  Fetal Station: -1  Baseline Rate: 145 bpm  Fetal Heart Rate: 150 BPM                  Vital Signs:   Vitals:    09/04/23 0508   BP: 113/55   Pulse:    Resp:    Temp:        Notes/comments: Comfortable with epidural. Bladder just catheterized for 650 ml urine. Now in active phase of labor.  [address], MD 9/4/2023 6:29 AM

## 2023-09-04 NOTE — L&D DELIVERY NOTE
Vaginal Delivery Summary - OB/GYN   Josué Dyer 28 y.o. female MRN: [id number]  Unit/Bed#: -01 Encounter: [id number]    Predelivery Diagnosis:  1. Pregnancy at 40w0d  2. Term pregnancy  Single fetus     Postdelivery Diagnosis:  1. Same as above  2. Delivery of term   3. Same as above - Delivered    Procedure: Spontaneous Vaginal Delivery    Attending: Dr. Luciana Mcburney    Anesthesia: Epidural    QBL: 63 cc  Admission Hg: Recent Labs     23  0341   HGB 12.5     Admission platelets:   Recent Labs     23  0341            Complications: none apparent    Specimens: cord blood, arterial and venous cord blood gasses, placenta to storage    Findings:   1. Viable male at 0807, with APGARS of 9 and 9 at 1 and 5 minutes respectively,  2. Spontaneous delivery of intact placenta at 0811  3. No laceration  4. Blood gases:    Umbilical Cord Venous Blood Gas:  Results from last 7 days   Lab Units 23  0808   PH COV  7.398   PCO2 COV mm HG 33.7   HCO3 COV mmol/L 20.3   BASE EXC COV mmol/L -3.7*   O2 CT CD VB mL/dL 15.0   O2 HGB, VENOUS CORD % 78.9     Umbilical Cord Arterial Blood Gas:  Results from last 7 days   Lab Units 23  0808   PH COA  7.340   PCO2 COA  48.5   PO2 COA mm HG 18.1   HCO3 COA mmol/L 25.6   BASE EXC COA mmol/L -0.7*   O2 CONTENT CORD ART ml/dl 7.5   O2 HGB, ARTERIAL CORD % 37.4       Disposition:  Patient tolerated the procedure well and was recovering in labor and delivery room     Brief History and Labor Course:    Josué Dyer is a 28 y.o. Barton Quant with an KATHRYN of 2023, by Last Menstrual Period at 40w0d gestation who was admitted for SROM. Please see labor timeline for complete labor course. The patient was completely dilated at 0740. She began to push at 0751. Description of procedure    After pushing for 16 minutes, at 0807 patient delivered a viable male , wt pending, apgars of 9 (1 min) and 9 (5 min). The fetal vertex delivered spontaneously. The baby was palpated for a nuchal cord and none was palpated. .  The anterior shoulder delivered atraumatically with maternal expulsive forces and the assistance of downward traction. The posterior shoulder delivered with maternal expulsive forces and the assistance of upward traction. The remainder of the fetus delivered spontaneously. Upon delivery, the infant was placed on the maternal abdomen and delayed cord clamping was performed. The umbilical cord was then doubly clamped and cut. The infant was noted to cry spontaneously and was moving all extremities appropriately. Arterial and venous cord blood gases and cord blood was collected for analysis. These were promptly sent to the lab. In the immediate post-partum, 30 units of IV pitocin was administered, and the uterus was noted to contract down well with massage and pitocin. The placenta delivered spontaneously at 7468 and was noted to have a centrally inserted 3 vessel cord. The vagina, cervix, perineum, and rectum were inspected and there was noted to be a no laceration. At the conclusion of the procedure, all needle, sponge, and instrument counts were noted to be correct. Patient tolerated the procedure well and was allowed to recover in labor and delivery room with family and  before being transferred to the post-partum floor.      Lulu Ramirez DO

## 2023-09-04 NOTE — ANESTHESIA PROCEDURE NOTES
Epidural Block    Patient location during procedure: OB  Start time: 9/4/2023 4:54 AM  Reason for block: at surgeon's request and primary anesthetic  Staffing  Performed by: Rasheed Mooney MD  Authorized by: Rasheed Mooney MD    Preanesthetic Checklist  Completed: patient identified, IV checked, risks and benefits discussed, surgical consent, monitors and equipment checked, pre-op evaluation and timeout performed  Epidural  Patient position: sitting  Prep: ChloraPrep  Patient monitoring: frequent blood pressure checks and continuous pulse ox  Approach: midline  Location: lumbar  Injection technique: MACI air  Needle  Needle type: Tuohy   Needle gauge: 18 G  Catheter type: end hole  Catheter size: 20 G  Catheter at skin depth: 10 cm  Catheter securement method: stabilization device  Test dose: negative  Assessment  Number of attempts: 1negative aspiration for CSF, negative aspiration for heme and no paresthesia on injection  patient tolerated the procedure well with no immediate complications

## 2023-09-04 NOTE — PLAN OF CARE
Problem: BIRTH - VAGINAL/ SECTION  Goal: Fetal and maternal status remain reassuring during the birth process  Description: INTERVENTIONS:  - Monitor vital signs  - Monitor fetal heart rate  - Monitor uterine activity  - Monitor labor progression (vaginal delivery)  - DVT prophylaxis  - Antibiotic prophylaxis  Outcome: Progressing  Goal: Emotionally satisfying birthing experience for mother/fetus  Description: Interventions:  - Assess, plan, implement and evaluate the nursing care given to the patient in labor  - Advocate the philosophy that each childbirth experience is a unique experience and support the family's chosen level of involvement and control during the labor process   - Actively participate in both the patient's and family's teaching of the birth process  - Consider cultural, Lutheran and age-specific factors and plan care for the patient in labor  Outcome: Progressing     Problem: Knowledge Deficit  Goal: Verbalizes understanding of labor plan  Description: Assess patient/family/caregiver's baseline knowledge level and ability to understand information. Provide education via patient/family/caregiver's preferred learning method at appropriate level of understanding. 1. Provide teaching at level of understanding. 2. Provide teaching via preferred learning method(s). Outcome: Progressing  Goal: Patient/family/caregiver demonstrates understanding of disease process, treatment plan, medications, and discharge instructions  Description: Complete learning assessment and assess knowledge base. Interventions:  - Provide teaching at level of understanding  - Provide teaching via preferred learning methods  Outcome: Progressing     Problem: Labor & Delivery  Goal: Manages discomfort  Description: Assess and monitor for signs and symptoms of discomfort. Assess patient's pain level regularly and per hospital policy. Administer medications as ordered.  Support use of nonpharmacological methods to help control pain such as distraction, imagery, relaxation, and application of heat and cold. Collaborate with interdisciplinary team and patient to determine appropriate pain management plan. 1. Include patient in decisions related to comfort. 2. Offer non-pharmacological pain management interventions. 3. Report ineffective pain management to physician. Outcome: Progressing  Goal: Patient vital signs are stable  Description: 1. Assess vital signs - vaginal delivery.   Outcome: Progressing     Problem: PAIN - ADULT  Goal: Verbalizes/displays adequate comfort level or baseline comfort level  Description: Interventions:  - Encourage patient to monitor pain and request assistance  - Assess pain using appropriate pain scale  - Administer analgesics based on type and severity of pain and evaluate response  - Implement non-pharmacological measures as appropriate and evaluate response  - Consider cultural and social influences on pain and pain management  - Notify physician/advanced practitioner if interventions unsuccessful or patient reports new pain  Outcome: Progressing     Problem: INFECTION - ADULT  Goal: Absence or prevention of progression during hospitalization  Description: INTERVENTIONS:  - Assess and monitor for signs and symptoms of infection  - Monitor lab/diagnostic results  - Monitor all insertion sites, i.e. indwelling lines, tubes, and drains  - Monitor endotracheal if appropriate and nasal secretions for changes in amount and color  - Porter appropriate cooling/warming therapies per order  - Administer medications as ordered  - Instruct and encourage patient and family to use good hand hygiene technique  - Identify and instruct in appropriate isolation precautions for identified infection/condition  Outcome: Progressing  Goal: Absence of fever/infection during neutropenic period  Description: INTERVENTIONS:  - Monitor WBC    Outcome: Progressing     Problem: SAFETY ADULT  Goal: Patient will remain free of falls  Description: INTERVENTIONS:  - Educate patient/family on patient safety including physical limitations  - Instruct patient to call for assistance with activity   - Consult OT/PT to assist with strengthening/mobility   - Keep Call bell within reach  - Keep bed low and locked with side rails adjusted as appropriate  - Keep care items and personal belongings within reach  - Initiate and maintain comfort rounds  - Make Fall Risk Sign visible to staff  - Apply yellow socks and bracelet for high fall risk patients  - Consider moving patient to room near nurses station  Outcome: Progressing  Goal: Maintain or return to baseline ADL function  Description: INTERVENTIONS:  -  Assess patient's ability to carry out ADLs; assess patient's baseline for ADL function and identify physical deficits which impact ability to perform ADLs (bathing, care of mouth/teeth, toileting, grooming, dressing, etc.)  - Assess/evaluate cause of self-care deficits   - Assess range of motion  - Assess patient's mobility; develop plan if impaired  - Assess patient's need for assistive devices and provide as appropriate  - Encourage maximum independence but intervene and supervise when necessary  - Involve family in performance of ADLs  - Assess for home care needs following discharge   - Consider OT consult to assist with ADL evaluation and planning for discharge  - Provide patient education as appropriate  Outcome: Progressing  Goal: Maintains/Returns to pre admission functional level  Description: INTERVENTIONS:  - Perform BMAT or MOVE assessment daily.   - Set and communicate daily mobility goal to care team and patient/family/caregiver.    - Collaborate with rehabilitation services on mobility goals if consulted  - Out of bed for toileting  - Record patient progress and toleration of activity level   Outcome: Progressing     Problem: DISCHARGE PLANNING  Goal: Discharge to home or other facility with appropriate resources  Description: INTERVENTIONS:  - Identify barriers to discharge w/patient and caregiver  - Arrange for needed discharge resources and transportation as appropriate  - Identify discharge learning needs (meds, wound care, etc.)  - Arrange for interpretive services to assist at discharge as needed  - Refer to Case Management Department for coordinating discharge planning if the patient needs post-hospital services based on physician/advanced practitioner order or complex needs related to functional status, cognitive ability, or social support system  Outcome: Progressing

## 2023-09-04 NOTE — ANESTHESIA POSTPROCEDURE EVALUATION
Post-Op Assessment Note    CV Status:  Stable    Pain management: satisfactory to patient     Mental Status:  Alert and awake   Hydration Status:  Euvolemic   PONV Controlled:  Controlled   Airway Patency:  Patent      Post Op Vitals Reviewed: Yes      Staff: Anesthesiologist         No notable events documented.     Vitals:    09/04/23 0634   BP: 93/59   Pulse: 71   Resp:    Temp:

## 2023-09-04 NOTE — ANESTHESIA PREPROCEDURE EVALUATION
Procedure:  LABOR ANALGESIA    Relevant Problems   ANESTHESIA (within normal limits)      Other   (+) Maternal migraine, history of      Labs:   Results from last 7 days   Lab Units 09/04/23  0341   WBC Thousand/uL 17.30*   HEMOGLOBIN g/dL 12.5   HEMATOCRIT % 37.9   PLATELETS Thousands/uL 235   NEUTROS PCT % 77*   MONOS PCT % 6   EOS PCT % 1     Results from last 7 days   Lab Units 09/04/23  0341   SODIUM mmol/L 133*   POTASSIUM mmol/L 3.7   CHLORIDE mmol/L 103   CO2 mmol/L 24   ANION GAP mmol/L 6   BUN mg/dL 9   CREATININE mg/dL 0.64   EGFR ml/min/1.73sq m 116   CALCIUM mg/dL 8.7   GLUCOSE RANDOM mg/dL 102   ALT U/L 14   AST U/L 20   ALK PHOS U/L 118*   ALBUMIN g/dL 4.0   TOTAL BILIRUBIN mg/dL 0.28       Physical Exam    Airway    Mallampati score: I  TM Distance: >3 FB  Neck ROM: full     Dental   No notable dental hx     Cardiovascular  Cardiovascular exam normal    Pulmonary  Pulmonary exam normal     Other Findings        Anesthesia Plan  ASA Score- 1     Anesthesia Type- epidural with ASA Monitors. Additional Monitors:   Airway Plan:     Comment: Patient is requesting epidural for labor. Patient seen and examined. The risks/benefits of Epidural anesthesia discussed including risk of PDPH, partial or failed block, and rare emergencies including infection, nerve injury and total spinal anesthesia. Anesthetic plan for potential c/s in event of emergency reviewed with patient. .       Plan Factors-    Chart reviewed. Existing labs reviewed. Patient summary reviewed. Induction-     Postoperative Plan-     Informed Consent- Anesthetic plan and risks discussed with patient. I personally reviewed this patient with the CRNA. Discussed and agreed on the Anesthesia Plan with the CRNA. Vidal Hodges

## 2023-09-04 NOTE — PROGRESS NOTES
Maternal discharge education reviewed with pt and significant other, Save Your Life magnet and educational flyers provided and discussed. Questions encouraged and answered, questions encouraged throughout remainder of stay. iPad educational videos in progress, PPD screen low risk.

## 2023-09-04 NOTE — OB LABOR/OXYTOCIN SAFETY PROGRESS
Labor Progress Note - Angelika Chen 28 y.o. female MRN: [id number]    Unit/Bed#: -01 Encounter: [id number]       Contraction Frequency (minutes): 3-4  Contraction Quality: Strong  Tachysystole: No   Cervical Dilation: 10        Cervical Effacement: 100  Fetal Station: 0  Baseline Rate: 135 bpm  Fetal Heart Rate: 150 BPM        Vital Signs:   Vitals:    09/04/23 0634   BP: 93/59   Pulse: 71   Resp:    Temp:        Notes/comments:   Patient with pelvic pressure. Examined and noted to be complete. Will start pushing.       Vielka Driver DO 9/4/2023 7:43 AM

## 2023-09-04 NOTE — H&P
H&P Exam - Obstetrics   Tammy Lua 28 y.o. female MRN: [id number]  Unit/Bed#: -01 Encounter: [id number]    Assessment/Plan     Assessment:  29yo  at 40W0D in active labor, GBS negative      Plan:  1.  40W0D gestation- fetal status reassuring, NST reactive/category 1  2. Labor- contractions every 3-4 mins, admit, IVF, labs  3. Pain management-patient requests epidural  4. GBS negative -no need for antibiotics  5. Elevated inhibin A level - has been undergoing weekly  testing x 4 weeks;  Prominent fetal bowel noted at 23 US/BPP; Pediatrics to be notified    Ephriam Gut. Alyse Tristan MD  OB/GYN Hospitalist  [phone]  2023   3:42 AM    D/W Dr. Carolyn Souza via [facility]  ________________________________________________________________    History of Present Illness   Chief Complaint: Active labor    HPI:  Tammy Lua is a 28 y.o.  female with an KATHRYN of 2023, by Last Menstrual Period at 40w0d weeks gestation who is being admitted for Active labor. Her current obstetrical history is significant for high inhibin level - has been receiving  testing past 4 weeks. Contractions: every 4  Minutes x 4 hours. Leakage of fluid: clear fluid leaking since 10:30pm.  Bleeding: no.  Fetal movement: present. Pregnancy complications: elevated inhibin level on cell free DNA testing. Review of Systems   Constitutional: Negative for activity change, appetite change, fatigue and fever. HENT: Negative for ear pain, facial swelling, hearing loss, mouth sores, nosebleeds, rhinorrhea, sinus pressure, sinus pain, sneezing and sore throat. Eyes: Negative for photophobia and visual disturbance. Respiratory: Negative for cough, chest tightness and shortness of breath. Cardiovascular: Negative for palpitations and leg swelling. Gastrointestinal: Positive for abdominal pain. Negative for abdominal distention, constipation, diarrhea, nausea and vomiting.    Endocrine: Negative for heat intolerance. Genitourinary: Positive for pelvic pain. Negative for dysuria, flank pain, frequency, vaginal bleeding and vaginal discharge. Musculoskeletal: Negative for back pain and neck pain. Skin: Negative for rash and wound. Allergic/Immunologic: Negative for immunocompromised state. Neurological: Negative for seizures, syncope, weakness and headaches. Hematological: Does not bruise/bleed easily. Psychiatric/Behavioral: Negative for agitation and behavioral problems. The patient is not hyperactive. Historical Information   OB History    Para Term  AB Living   3 1 1   1 1   SAB IAB Ectopic Multiple Live Births           1      # Outcome Date GA Lbr Jaren/2nd Weight Sex Delivery Anes PTL Lv   3 Current            2 AB 2022 5w0d    SAB         Birth Comments: chemical pregnancy   1 Term 20 39w0d  3260 g (7 lb 3 oz) F Vag-Spont EPI N MALIKA     Baby complications/comments:   Past Medical History:   Diagnosis Date   • Abnormal Pap smear of cervix    • Migraine      Past Surgical History:   Procedure Laterality Date   • WISDOM TOOTH EXTRACTION N/A      Social History   Social History     Substance and Sexual Activity   Alcohol Use Not Currently     Social History     Substance and Sexual Activity   Drug Use Never     Social History     Tobacco Use   Smoking Status Never   • Passive exposure: Never   Smokeless Tobacco Never     E-Cigarette/Vaping   • E-Cigarette Use Never User      E-Cigarette/Vaping Substances   • Nicotine No    • THC No    • CBD No    • Flavoring No    • Other No    • Unknown No      Family History:   Family History   Problem Relation Age of Onset   • Breast cancer Paternal Grandmother    • Uterine cancer Neg Hx    • Ovarian cancer Neg Hx    • Colon cancer Neg Hx        Meds/Allergies   PTA meds:   Prior to Admission Medications   Prescriptions Last Dose Informant Patient Reported? Taking?    B Complex Vitamins (B COMPLEX 1 PO) 9/3/2023 Self Yes Yes Cholecalciferol (Vitamin D) 50 MCG (2000 UT) CAPS 9/3/2023 Self Yes Yes   Sig: every 24 hours   Collagen-Vitamin C-Biotin (COLLAGEN 1500/C PO) 9/3/2023 Self Yes Yes   Doxylamine Succinate, Sleep, (UNISOM PO) 9/4/2023 Self Yes Yes   Sig: Take 1 tablet by mouth daily at bedtime   Magnesium 100 MG CAPS 9/3/2023 Self Yes Yes   Prenatal Vit-Fe Fumarate-FA (PRENATAL VITAMINS PO) 9/3/2023 Self Yes Yes   Sig: Take 1 tablet by mouth Daily at 2am   ascorbic acid (VITAMIN C) 500 mg tablet 9/3/2023 Self Yes Yes   Sig: as directed Orally   vitamin B-12 (VITAMIN B-12) 1,000 mcg tablet 9/3/2023 Self Yes Yes   Sig: every 24 hours      Facility-Administered Medications: None     Allergies   Allergen Reactions   • Amoxicillin Hives       Objective   Vitals: Blood pressure 101/68, pulse 86, temperature 97.6 °F (36.4 °C), temperature source Oral, resp. rate 20, last menstrual period 11/28/2022, not currently breastfeeding. There is no height or weight on file to calculate BMI. Invasive Devices     Peripheral Intravenous Line  Duration           Peripheral IV 09/04/23 Left Antecubital <1 day                Physical Exam  Constitutional:       General: She is not in acute distress. Appearance: Normal appearance. She is normal weight. She is not ill-appearing, toxic-appearing or diaphoretic. HENT:      Head: Normocephalic. Nose: Nose normal.      Mouth/Throat:      Mouth: Mucous membranes are moist.      Pharynx: Oropharynx is clear. Eyes:      General: No scleral icterus. Cardiovascular:      Rate and Rhythm: Normal rate. Pulses: Normal pulses. Pulmonary:      Effort: Pulmonary effort is normal.      Breath sounds: No wheezing. Abdominal:      General: Bowel sounds are normal. There is no distension. Palpations: Abdomen is soft. There is mass (term gravid uterus). Tenderness: There is no abdominal tenderness. There is no guarding or rebound. Genitourinary:     General: Normal vulva.       Vagina: No vaginal discharge (leaking clear fluid). Musculoskeletal:      Cervical back: Neck supple. Right lower leg: No edema. Left lower leg: No edema. Skin:     Capillary Refill: Capillary refill takes less than 2 seconds. Coloration: Skin is not jaundiced. Findings: No lesion or rash. Neurological:      General: No focal deficit present. Mental Status: She is alert. Mental status is at baseline. Motor: No weakness. Deep Tendon Reflexes: Reflexes normal.   Psychiatric:         Mood and Affect: Mood normal.         Behavior: Behavior normal.         Thought Content: Thought content normal.         Judgment: Judgment normal.     FHTs - baseline 150, accelerations present, decelerations absent, moderate variaiblity, category 1    toco - q 3 mins    Cervix 4/100/-1        Prenatal Labs: I have personally reviewed pertinent reports.   , Blood Type:   Lab Results   Component Value Date/Time    ABO Grouping B 03/03/2023 08:55 AM     , D (Rh type):   Lab Results   Component Value Date/Time    Rh Type Positive 03/03/2023 08:55 AM     , Antibody Screen: No results found for: "ANTIBODYSCR" , HCT/HGB:   Lab Results   Component Value Date/Time    HCT 32.0 (L) 06/12/2023 08:39 AM    Hemoglobin 11.0 (L) 06/12/2023 08:39 AM    External Hemoglobin 11.7 03/03/2023 12:00 AM      , MCV:   Lab Results   Component Value Date/Time    MCV 89 06/12/2023 08:39 AM      , Platelets:   Lab Results   Component Value Date/Time    Platelet Count 952 32/92/6924 08:39 AM    External Platelets 714 63/23/1800 12:00 AM      , 1 hour Glucola:   Lab Results   Component Value Date/Time    Glucose 99 06/12/2023 08:39 AM   , 3 hour GTT: No results found for: "Ema Dennise", Varicella: No results found for: "VARICELLAIGG"    , Rubella: No results found for: "RUBELLAIGGQT"     , VDRL/RPR:   Lab Results   Component Value Date/Time    RPR Non Reactive 06/12/2023 08:39 AM      , Urine Culture/Screen: No results found for: "URINECX"    , Urine Drug Screen: No results found for: "AMPHETUR", "BARBTUR", "BDZUR", "THCUR", "COCAINEUR", "[place]", "OPIATEUR", "PCPUR", "Kiana Rude", "ECSTASYUR", "TRICYCLICSUR", Hep B:   Lab Results   Component Value Date/Time    Hepatitis B Surface Ag NR 03/03/2023 12:00 AM     , Hep C: No components found for: "HEPCSAG", "EXTHEPCSAG"   , HIV:   Lab Results   Component Value Date/Time    HIV-1/HIV-2 AB Non-Reactive 03/03/2023 12:00 AM     , Chlamydia:   Lab Results   Component Value Date/Time    External Chlamydia Screen neg 01/26/2023 12:00 AM     , Gonorrhea: No results found for: "LABNGO"  , Group B Strep:    Lab Results   Component Value Date/Time    Strep Grp B NATALI Negative 08/09/2023 09:48 AM      ,     Imaging, EKG, Pathology, and Other Studies: I have personally reviewed pertinent reports.       7/10/2023 US in PTC    RESULTS     Fetus # 1 of 1  Vertex presentation  Fetal growth appeared normal  Placenta Location = Posterior     MEASUREMENTS (* Included In Average GA)     AC             27.71 cm        31 weeks 6 days* (42%)  BPD             8.58 cm        34 weeks 4 days* (96%)  HC             31.75 cm        35 weeks 5 days* (95%)  Femur           6.39 cm        33 weeks 0 days* (65%)     HC/AC           1.15 [0.96 - 1.17]                 (92%)  FL/AC             23 [20 - 24]  FL/BPD            74 [71 - 87]  EFW Hadlock 4   2047 grams - 4 lbs 8 oz                 (64%)     THE AVERAGE GESTATIONAL AGE is 33 weeks 6 days +/- 21 days.     AMNIOTIC FLUID     Q1: 4.1      Q2: 3.2      Q3: 3.2      Q4: 4.3  IZA Total = 14.8 cm  Amniotic Fluid: Normal

## 2023-09-04 NOTE — PLAN OF CARE
Problem: BIRTH - VAGINAL/ SECTION  Goal: Fetal and maternal status remain reassuring during the birth process  Description: INTERVENTIONS:  - Monitor vital signs  - Monitor fetal heart rate  - Monitor uterine activity  - Monitor labor progression (vaginal delivery)  - DVT prophylaxis  - Antibiotic prophylaxis  Outcome: Completed  Goal: Emotionally satisfying birthing experience for mother/fetus  Description: Interventions:  - Assess, plan, implement and evaluate the nursing care given to the patient in labor  - Advocate the philosophy that each childbirth experience is a unique experience and support the family's chosen level of involvement and control during the labor process   - Actively participate in both the patient's and family's teaching of the birth process  - Consider cultural, Zoroastrianism and age-specific factors and plan care for the patient in labor  Outcome: Completed     Problem: Knowledge Deficit  Goal: Verbalizes understanding of labor plan  Description: Assess patient/family/caregiver's baseline knowledge level and ability to understand information. Provide education via patient/family/caregiver's preferred learning method at appropriate level of understanding. 1. Provide teaching at level of understanding. 2. Provide teaching via preferred learning method(s). Outcome: Completed  Goal: Patient/family/caregiver demonstrates understanding of disease process, treatment plan, medications, and discharge instructions  Description: Complete learning assessment and assess knowledge base. Interventions:  - Provide teaching at level of understanding  - Provide teaching via preferred learning methods  Outcome: Completed     Problem: Labor & Delivery  Goal: Manages discomfort  Description: Assess and monitor for signs and symptoms of discomfort. Assess patient's pain level regularly and per hospital policy. Administer medications as ordered.  Support use of nonpharmacological methods to help control pain such as distraction, imagery, relaxation, and application of heat and cold. Collaborate with interdisciplinary team and patient to determine appropriate pain management plan. 1. Include patient in decisions related to comfort. 2. Offer non-pharmacological pain management interventions. 3. Report ineffective pain management to physician. Outcome: Completed  Goal: Patient vital signs are stable  Description: 1. Assess vital signs - vaginal delivery.   Outcome: Completed     Problem: PAIN - ADULT  Goal: Verbalizes/displays adequate comfort level or baseline comfort level  Description: Interventions:  - Encourage patient to monitor pain and request assistance  - Assess pain using appropriate pain scale  - Administer analgesics based on type and severity of pain and evaluate response  - Implement non-pharmacological measures as appropriate and evaluate response  - Consider cultural and social influences on pain and pain management  - Notify physician/advanced practitioner if interventions unsuccessful or patient reports new pain  9/4/2023 1234 by Brenna Rene RN  Outcome: Progressing  9/4/2023 1156 by Brenna Rene RN  Outcome: Progressing     Problem: INFECTION - ADULT  Goal: Absence or prevention of progression during hospitalization  Description: INTERVENTIONS:  - Assess and monitor for signs and symptoms of infection  - Monitor lab/diagnostic results  - Monitor all insertion sites, i.e. indwelling lines, tubes, and drains  - Monitor endotracheal if appropriate and nasal secretions for changes in amount and color  - [place] appropriate cooling/warming therapies per order  - Administer medications as ordered  - Instruct and encourage patient and family to use good hand hygiene technique  - Identify and instruct in appropriate isolation precautions for identified infection/condition  9/4/2023 1234 by Brenna Rene RN  Outcome: Progressing  9/4/2023 1156 by Brenna Rene RN  Outcome: Progressing  Goal: Absence of fever/infection during neutropenic period  Description: INTERVENTIONS:  - Monitor WBC    9/4/2023 1234 by Brittney Thompson RN  Outcome: Progressing  9/4/2023 1156 by Brittney Thompson RN  Outcome: Progressing     Problem: SAFETY ADULT  Goal: Patient will remain free of falls  Description: INTERVENTIONS:  - Educate patient/family on patient safety including physical limitations  - Instruct patient to call for assistance with activity   - Consult OT/PT to assist with strengthening/mobility   - Keep Call bell within reach  - Keep bed low and locked with side rails adjusted as appropriate  - Keep care items and personal belongings within reach  - Initiate and maintain comfort rounds  - Make Fall Risk Sign visible to staff  - Apply yellow socks and bracelet for high fall risk patients  - Consider moving patient to room near nurses station  9/4/2023 1234 by Brittney Thompson RN  Outcome: Progressing  9/4/2023 1156 by Brittney Thompson RN  Outcome: Progressing  Goal: Maintain or return to baseline ADL function  Description: INTERVENTIONS:  -  Assess patient's ability to carry out ADLs; assess patient's baseline for ADL function and identify physical deficits which impact ability to perform ADLs (bathing, care of mouth/teeth, toileting, grooming, dressing, etc.)  - Assess/evaluate cause of self-care deficits   - Assess range of motion  - Assess patient's mobility; develop plan if impaired  - Assess patient's need for assistive devices and provide as appropriate  - Encourage maximum independence but intervene and supervise when necessary  - Involve family in performance of ADLs  - Assess for home care needs following discharge   - Consider OT consult to assist with ADL evaluation and planning for discharge  - Provide patient education as appropriate  9/4/2023 1234 by Brittney Thompson RN  Outcome: Progressing  9/4/2023 1156 by Brittney Thompson RN  Outcome: Progressing  Goal: Maintains/Returns to pre admission functional level  Description: INTERVENTIONS:  - Perform BMAT or MOVE assessment daily.   - Set and communicate daily mobility goal to care team and patient/family/caregiver. - Collaborate with rehabilitation services on mobility goals if consulted  - Out of bed for toileting  - Record patient progress and toleration of activity level   9/4/2023 1234 by Latisha Cowden, RN  Outcome: Progressing  9/4/2023 1156 by Latisha Cowden, RN  Outcome: Progressing     Problem: DISCHARGE PLANNING  Goal: Discharge to home or other facility with appropriate resources  Description: INTERVENTIONS:  - Identify barriers to discharge w/patient and caregiver  - Arrange for needed discharge resources and transportation as appropriate  - Identify discharge learning needs (meds, wound care, etc.)  - Arrange for interpretive services to assist at discharge as needed  - Refer to Case Management Department for coordinating discharge planning if the patient needs post-hospital services based on physician/advanced practitioner order or complex needs related to functional status, cognitive ability, or social support system  9/4/2023 1234 by Latisha Cowden, RN  Outcome: Progressing  9/4/2023 1156 by Latisha Cowden, RN  Outcome: Progressing     Problem: Nutrition/Hydration-ADULT  Goal: Nutrient/Hydration intake appropriate for improving, restoring or maintaining nutritional needs  Description: Monitor and assess patient's nutrition/hydration status for malnutrition. Collaborate with interdisciplinary team and initiate plan and interventions as ordered. Monitor patient's weight and dietary intake as ordered or per policy. Utilize nutrition screening tool and intervene as necessary. Determine patient's food preferences and provide high-protein, high-caloric foods as appropriate.      INTERVENTIONS:  - Monitor oral intake, urinary output, labs, and treatment plans  - Assess nutrition and hydration status and recommend course of action  - Evaluate amount of meals eaten  - Assist patient with eating if necessary   - Allow adequate time for meals  - Recommend/ encourage appropriate diets, oral nutritional supplements, and vitamin/mineral supplements  - Order, calculate, and assess calorie counts as needed  - Recommend, monitor, and adjust tube feedings and TPN/PPN based on assessed needs  - Assess need for intravenous fluids  - Provide specific nutrition/hydration education as appropriate  - Include patient/family/caregiver in decisions related to nutrition  9/4/2023 1234 by Peggyann Lanes, RN  Outcome: Progressing  9/4/2023 1156 by Peggyann Lanes, RN  Outcome: Progressing

## 2023-09-05 LAB
ERYTHROCYTE [DISTWIDTH] IN BLOOD BY AUTOMATED COUNT: 14 % (ref 11.6–15.1)
HCT VFR BLD AUTO: 33.7 % (ref 34.8–46.1)
HGB BLD-MCNC: 11.1 G/DL (ref 11.5–15.4)
MCH RBC QN AUTO: 29.2 PG (ref 26.8–34.3)
MCHC RBC AUTO-ENTMCNC: 32.9 G/DL (ref 31.4–37.4)
MCV RBC AUTO: 89 FL (ref 82–98)
PLATELET # BLD AUTO: 210 THOUSANDS/UL (ref 149–390)
PMV BLD AUTO: 12.4 FL (ref 8.9–12.7)
RBC # BLD AUTO: 3.8 MILLION/UL (ref 3.81–5.12)
WBC # BLD AUTO: 14.48 THOUSAND/UL (ref 4.31–10.16)

## 2023-09-05 PROCEDURE — 99024 POSTOP FOLLOW-UP VISIT: CPT | Performed by: STUDENT IN AN ORGANIZED HEALTH CARE EDUCATION/TRAINING PROGRAM

## 2023-09-05 PROCEDURE — 85027 COMPLETE CBC AUTOMATED: CPT | Performed by: OBSTETRICS & GYNECOLOGY

## 2023-09-05 RX ORDER — OXYTOCIN/RINGER'S LACTATE 30/500 ML
250 PLASTIC BAG, INJECTION (ML) INTRAVENOUS ONCE
Status: CANCELLED | OUTPATIENT
Start: 2023-09-05 | End: 2023-09-05

## 2023-09-05 RX ADMIN — ACETAMINOPHEN 650 MG: 325 TABLET, FILM COATED ORAL at 08:35

## 2023-09-05 RX ADMIN — DOCUSATE SODIUM 100 MG: 100 CAPSULE, LIQUID FILLED ORAL at 18:03

## 2023-09-05 RX ADMIN — DOCUSATE SODIUM 100 MG: 100 CAPSULE, LIQUID FILLED ORAL at 08:35

## 2023-09-05 RX ADMIN — IBUPROFEN 600 MG: 600 TABLET ORAL at 03:42

## 2023-09-05 RX ADMIN — IBUPROFEN 600 MG: 600 TABLET ORAL at 12:19

## 2023-09-05 RX ADMIN — IBUPROFEN 600 MG: 600 TABLET ORAL at 18:03

## 2023-09-05 NOTE — PLAN OF CARE
Problem: PAIN - ADULT  Goal: Verbalizes/displays adequate comfort level or baseline comfort level  Description: Interventions:  - Encourage patient to monitor pain and request assistance  - Assess pain using appropriate pain scale  - Administer analgesics based on type and severity of pain and evaluate response  - Implement non-pharmacological measures as appropriate and evaluate response  - Consider cultural and social influences on pain and pain management  - Notify physician/advanced practitioner if interventions unsuccessful or patient reports new pain  Outcome: Progressing     Problem: INFECTION - ADULT  Goal: Absence or prevention of progression during hospitalization  Description: INTERVENTIONS:  - Assess and monitor for signs and symptoms of infection  - Monitor lab/diagnostic results  - Monitor all insertion sites, i.e. indwelling lines, tubes, and drains  - Monitor endotracheal if appropriate and nasal secretions for changes in amount and color  - [place] appropriate cooling/warming therapies per order  - Administer medications as ordered  - Instruct and encourage patient and family to use good hand hygiene technique  - Identify and instruct in appropriate isolation precautions for identified infection/condition  Outcome: Progressing  Goal: Absence of fever/infection during neutropenic period  Description: INTERVENTIONS:  - Monitor WBC    Outcome: Progressing     Problem: SAFETY ADULT  Goal: Patient will remain free of falls  Description: INTERVENTIONS:  - Educate patient/family on patient safety including physical limitations  - Instruct patient to call for assistance with activity   - Consult OT/PT to assist with strengthening/mobility   - Keep Call bell within reach  - Keep bed low and locked with side rails adjusted as appropriate  - Keep care items and personal belongings within reach  - Initiate and maintain comfort rounds  - Make Fall Risk Sign visible to staff    - Apply yellow socks and bracelet for high fall risk patients  - Consider moving patient to room near nurses station  Outcome: Progressing  Goal: Maintain or return to baseline ADL function  Description: INTERVENTIONS:  -  Assess patient's ability to carry out ADLs; assess patient's baseline for ADL function and identify physical deficits which impact ability to perform ADLs (bathing, care of mouth/teeth, toileting, grooming, dressing, etc.)  - Assess/evaluate cause of self-care deficits   - Assess range of motion  - Assess patient's mobility; develop plan if impaired  - Assess patient's need for assistive devices and provide as appropriate  - Encourage maximum independence but intervene and supervise when necessary  - Involve family in performance of ADLs  - Assess for home care needs following discharge   - Consider OT consult to assist with ADL evaluation and planning for discharge  - Provide patient education as appropriate  Outcome: Progressing  Goal: Maintains/Returns to pre admission functional level  Description: INTERVENTIONS:  - Perform BMAT or MOVE assessment daily.   - Set and communicate daily mobility goal to care team and patient/family/caregiver.    - Collaborate with rehabilitation services on mobility goals if consulted    - Out of bed for toileting  - Record patient progress and toleration of activity level   Outcome: Progressing     Problem: DISCHARGE PLANNING  Goal: Discharge to home or other facility with appropriate resources  Description: INTERVENTIONS:  - Identify barriers to discharge w/patient and caregiver  - Arrange for needed discharge resources and transportation as appropriate  - Identify discharge learning needs (meds, wound care, etc.)  - Arrange for interpretive services to assist at discharge as needed  - Refer to Case Management Department for coordinating discharge planning if the patient needs post-hospital services based on physician/advanced practitioner order or complex needs related to functional status, cognitive ability, or social support system  Outcome: Progressing     Problem: Nutrition/Hydration-ADULT  Goal: Nutrient/Hydration intake appropriate for improving, restoring or maintaining nutritional needs  Description: Monitor and assess patient's nutrition/hydration status for malnutrition. Collaborate with interdisciplinary team and initiate plan and interventions as ordered. Monitor patient's weight and dietary intake as ordered or per policy. Utilize nutrition screening tool and intervene as necessary. Determine patient's food preferences and provide high-protein, high-caloric foods as appropriate.      INTERVENTIONS:  - Monitor oral intake, urinary output, labs, and treatment plans  - Assess nutrition and hydration status and recommend course of action  - Evaluate amount of meals eaten  - Assist patient with eating if necessary   - Allow adequate time for meals  - Recommend/ encourage appropriate diets, oral nutritional supplements, and vitamin/mineral supplements  - Order, calculate, and assess calorie counts as needed  - Recommend, monitor, and adjust tube feedings and TPN/PPN based on assessed needs  - Assess need for intravenous fluids  - Provide specific nutrition/hydration education as appropriate  - Include patient/family/caregiver in decisions related to nutrition  Outcome: Progressing

## 2023-09-05 NOTE — PLAN OF CARE
Problem: PAIN - ADULT  Goal: Verbalizes/displays adequate comfort level or baseline comfort level  Description: Interventions:  - Encourage patient to monitor pain and request assistance  - Assess pain using appropriate pain scale  - Administer analgesics based on type and severity of pain and evaluate response  - Implement non-pharmacological measures as appropriate and evaluate response  - Consider cultural and social influences on pain and pain management  - Notify physician/advanced practitioner if interventions unsuccessful or patient reports new pain  Outcome: Progressing     Problem: INFECTION - ADULT  Goal: Absence or prevention of progression during hospitalization  Description: INTERVENTIONS:  - Assess and monitor for signs and symptoms of infection  - Monitor lab/diagnostic results  - Monitor all insertion sites, i.e. indwelling lines, tubes, and drains  - Monitor endotracheal if appropriate and nasal secretions for changes in amount and color  - [place] appropriate cooling/warming therapies per order  - Administer medications as ordered  - Instruct and encourage patient and family to use good hand hygiene technique  - Identify and instruct in appropriate isolation precautions for identified infection/condition  Outcome: Progressing  Goal: Absence of fever/infection during neutropenic period  Description: INTERVENTIONS:  - Monitor WBC    Outcome: Progressing     Problem: SAFETY ADULT  Goal: Patient will remain free of falls  Description: INTERVENTIONS:  - Educate patient/family on patient safety including physical limitations  - Instruct patient to call for assistance with activity   - Consult OT/PT to assist with strengthening/mobility   - Keep Call bell within reach  - Keep bed low and locked with side rails adjusted as appropriate  - Keep care items and personal belongings within reach  - Initiate and maintain comfort rounds  - Make Fall Risk Sign visible to staff  - Apply yellow socks and bracelet for high fall risk patients  - Consider moving patient to room near nurses station  Outcome: Progressing  Goal: Maintain or return to baseline ADL function  Description: INTERVENTIONS:  -  Assess patient's ability to carry out ADLs; assess patient's baseline for ADL function and identify physical deficits which impact ability to perform ADLs (bathing, care of mouth/teeth, toileting, grooming, dressing, etc.)  - Assess/evaluate cause of self-care deficits   - Assess range of motion  - Assess patient's mobility; develop plan if impaired  - Assess patient's need for assistive devices and provide as appropriate  - Encourage maximum independence but intervene and supervise when necessary  - Involve family in performance of ADLs  - Assess for home care needs following discharge   - Consider OT consult to assist with ADL evaluation and planning for discharge  - Provide patient education as appropriate  Outcome: Progressing  Goal: Maintains/Returns to pre admission functional level  Description: INTERVENTIONS:  - Perform BMAT or MOVE assessment daily.   - Set and communicate daily mobility goal to care team and patient/family/caregiver.    - Out of bed for toileting  - Record patient progress and toleration of activity level   Outcome: Progressing     Problem: DISCHARGE PLANNING  Goal: Discharge to home or other facility with appropriate resources  Description: INTERVENTIONS:  - Identify barriers to discharge w/patient and caregiver  - Arrange for needed discharge resources and transportation as appropriate  - Identify discharge learning needs (meds, wound care, etc.)  - Arrange for interpretive services to assist at discharge as needed  - Refer to Case Management Department for coordinating discharge planning if the patient needs post-hospital services based on physician/advanced practitioner order or complex needs related to functional status, cognitive ability, or social support system  Outcome: Progressing     Problem: Nutrition/Hydration-ADULT  Goal: Nutrient/Hydration intake appropriate for improving, restoring or maintaining nutritional needs  Description: Monitor and assess patient's nutrition/hydration status for malnutrition. Collaborate with interdisciplinary team and initiate plan and interventions as ordered. Monitor patient's weight and dietary intake as ordered or per policy. Utilize nutrition screening tool and intervene as necessary. Determine patient's food preferences and provide high-protein, high-caloric foods as appropriate.      INTERVENTIONS:  - Monitor oral intake, urinary output, labs, and treatment plans  - Assess nutrition and hydration status and recommend course of action  - Evaluate amount of meals eaten  - Assist patient with eating if necessary   - Allow adequate time for meals  - Recommend/ encourage appropriate diets, oral nutritional supplements, and vitamin/mineral supplements  - Order, calculate, and assess calorie counts as needed  - Recommend, monitor, and adjust tube feedings and TPN/PPN based on assessed needs  - Assess need for intravenous fluids  - Provide specific nutrition/hydration education as appropriate  - Include patient/family/caregiver in decisions related to nutrition  Outcome: Progressing

## 2023-09-05 NOTE — PROGRESS NOTES
Progress Note - OB/GYN  Post-Partum Physician Note   Quoc Salas 28 y.o. female MRN: [id number]  Unit/Bed#:  208-01 Encounter: [id number]    Assessment:  28y.o. year-old , postpartum day #1 s/p     Plan:   Continue routine postpartum care  Encourage ambulation  Patient is a candidate for early discharge, pending disposition of infant.     _________________________________    Subjective:   Pain: Well controlled  Tolerating Oral Intake: Yes  Voiding: Yes  Ambulating: Yes  Breastfeeding: Yes  Chest Pain: No  Shortness of Breath: No  Leg Pain/Discomfort: No  Lochia: Normal    Objective:   Vitals:    23 1900 23 2248 23 0338 23 0802   BP: 103/66 99/68 103/70 115/83   BP Location: Left arm Right arm Left arm Right arm   Pulse: 79 92 81 98   Resp: 18 18 18 16   Temp: 98 °F (36.7 °C) 98 °F (36.7 °C) 97.6 °F (36.4 °C) 97.7 °F (36.5 °C)   TempSrc: Oral Oral Axillary Oral   SpO2: 97% 96% 95% 97%   Weight:       Height:           Intake/Output Summary (Last 24 hours) at 2023 0914  Last data filed at 2023 1601  Gross per 24 hour   Intake 480 ml   Output 1350 ml   Net -870 ml       Physical Exam:  General: in no apparent distress, well developed and well nourished, alert, oriented times 3, afebrile and normal vitals  Abdomen: abdomen is soft without significant tenderness, masses, organomegaly or guarding  Fundus: Firm and non-tender, 1 below the umbilicus  Lower extremeties: nontender    Labs/Tests:   Lab Results   Component Value Date/Time    HGB 11.1 (L) 2023 06:01 AM    HGB 12.5 2023 03:41 AM     2023 06:01 AM     2023 03:41 AM    WBC 14.48 (H) 2023 06:01 AM    WBC 17.30 (H) 2023 03:41 AM    CREATININE 0.64 2023 03:41 AM    ALT 14 2023 03:41 AM    AST 20 2023 03:41 AM        Brief OB Lab review:  ABO Grouping   Date Value Ref Range Status   2023 B  Final      Rh Factor   Date Value Ref Range Status 09/04/2023 Positive  Final     Rh Type   Date Value Ref Range Status   03/03/2023 Positive  Final     Comment:     Please note: Prior records for this patient's ABO / Rh type are not  available for additional verification.       No results found for: "ANTIBODYSCR"  No results found for: "RUBM"    MEDS:   Current Facility-Administered Medications   Medication Dose Route Frequency   • acetaminophen (TYLENOL) tablet 650 mg  650 mg Oral Q4H PRN   • benzocaine-menthol-lanolin-aloe (DERMOPLAST) 20-0.5 % topical spray 1 Application  1 Application Topical B3L PRN   • bupivacaine (PF) (MARCAINE) 0.25 % injection 30 mL  30 mL Infiltration Once PRN   • calcium carbonate (TUMS) chewable tablet 1,000 mg  1,000 mg Oral Daily PRN   • docusate sodium (COLACE) capsule 100 mg  100 mg Oral BID   • hydrocortisone 1 % cream 1 Application  1 Application Topical Daily PRN   • ibuprofen (MOTRIN) tablet 600 mg  600 mg Oral Q6H   • lactated ringers infusion  125 mL/hr Intravenous Continuous   • ondansetron (ZOFRAN) injection 4 mg  4 mg Intravenous Q8H PRN   • oxyCODONE (ROXICODONE) IR tablet 5 mg  5 mg Oral Q3H PRN   • ropivacaine 0.2% PCEA   Epidural Continuous   • witch hazel-glycerin (TUCKS) topical pad 1 Pad  1 Pad Topical Q4H PRN     Invasive Devices     None                   Merna Henderson MD  9/5/2023 9:14 AM

## 2023-09-06 VITALS
WEIGHT: 167 LBS | HEIGHT: 65 IN | DIASTOLIC BLOOD PRESSURE: 65 MMHG | TEMPERATURE: 98.2 F | HEART RATE: 77 BPM | BODY MASS INDEX: 27.82 KG/M2 | SYSTOLIC BLOOD PRESSURE: 114 MMHG | OXYGEN SATURATION: 96 % | RESPIRATION RATE: 18 BRPM

## 2023-09-06 PROCEDURE — NC001 PR NO CHARGE: Performed by: OBSTETRICS & GYNECOLOGY

## 2023-09-06 PROCEDURE — 99024 POSTOP FOLLOW-UP VISIT: CPT | Performed by: OBSTETRICS & GYNECOLOGY

## 2023-09-06 RX ORDER — IBUPROFEN 600 MG/1
600 TABLET ORAL EVERY 6 HOURS PRN
Refills: 0
Start: 2023-09-06

## 2023-09-06 RX ORDER — ACETAMINOPHEN 325 MG/1
650 TABLET ORAL EVERY 6 HOURS PRN
Refills: 0
Start: 2023-09-06

## 2023-09-06 RX ADMIN — IBUPROFEN 600 MG: 600 TABLET ORAL at 06:18

## 2023-09-06 RX ADMIN — DOCUSATE SODIUM 100 MG: 100 CAPSULE, LIQUID FILLED ORAL at 09:36

## 2023-09-06 RX ADMIN — IBUPROFEN 600 MG: 600 TABLET ORAL at 00:41

## 2023-09-06 NOTE — LACTATION NOTE
This note was copied from a baby's chart. Met with parents to follow up and assess tongue of baby as mother requested with last encounter. Baby was able to cup finger during assessment with no snap back felt and brought tongue past lips. No restriction was noted on assessment.

## 2023-09-06 NOTE — LACTATION NOTE
This note was copied from a baby's chart. Met with parents to follow up and discuss the Breastfeeding Discharge Booklet. Baby is currently at a 8.9% weight loss, having appropriate output and 24 hour bilirubin was low. Addressed questions pertaining to tongue restrictions and parents will be calling when baby returns from procedure to have a tongue evaluation. Showed the feeding log to could be continued using once home for up to the week and discussed the importance of ensuring that baby feeds 8-12x in 24 hours and that baby has 6 wet diapers or more that are becoming more dilute as well as soiled diapers that are transitioning demonstrated by color change from meconium to a yellow/gold seedy loose stool by day 5. Mother was given resources to look up medications to ensure they are safe with breastfeeding, by communicating with the [address] [place] [address]  as well as using [facility] [employer] [facility] [employer] (assisted mother to pin to home screen on personal phone). Mother aware of engorgement time frame (when mature milk comes in) and management as well as how to deal with conditions that may occur while breastfeeding (plugged ducts, milk blebs and mastitis) and when is appropriate to communicate with her OB/GYN and/or a lactation consultant. Mother comfortable with how to set up a pump, how to cycle (stimulation vs expression phases during a pumping session), importance of flange fit and trying different sizes to ensure best fit, milk storage and how to properly clean parts. Mother was shown handouts for tips on pumping when returning to work and paced bottle feeding that was discussed and demonstrated. Mother was shown community resources for continued support in breastfeeding once discharged home.  She was encouraged to communicate with [address] [place] [address] for lactation home visits and/or with her baby's pediatrician for lactation support/services that could be offered in the practice or close to home. Parents were encouraged to call for further questions that arise prior to discharge.

## 2023-09-06 NOTE — PLAN OF CARE
Problem: PAIN - ADULT  Goal: Verbalizes/displays adequate comfort level or baseline comfort level  Description: Interventions:  - Encourage patient to monitor pain and request assistance  - Assess pain using appropriate pain scale  - Administer analgesics based on type and severity of pain and evaluate response  - Implement non-pharmacological measures as appropriate and evaluate response  - Consider cultural and social influences on pain and pain management  - Notify physician/advanced practitioner if interventions unsuccessful or patient reports new pain  Outcome: Progressing     Problem: INFECTION - ADULT  Goal: Absence or prevention of progression during hospitalization  Description: INTERVENTIONS:  - Assess and monitor for signs and symptoms of infection  - Monitor lab/diagnostic results  - Monitor all insertion sites, i.e. indwelling lines, tubes, and drains  - Monitor endotracheal if appropriate and nasal secretions for changes in amount and color  - [place] appropriate cooling/warming therapies per order  - Administer medications as ordered  - Instruct and encourage patient and family to use good hand hygiene technique  - Identify and instruct in appropriate isolation precautions for identified infection/condition  Outcome: Progressing  Goal: Absence of fever/infection during neutropenic period  Description: INTERVENTIONS:  - Monitor WBC    Outcome: Progressing     Problem: SAFETY ADULT  Goal: Patient will remain free of falls  Description: INTERVENTIONS:  - Educate patient/family on patient safety including physical limitations  - Instruct patient to call for assistance with activity   - Consult OT/PT to assist with strengthening/mobility   - Keep Call bell within reach  - Keep bed low and locked with side rails adjusted as appropriate  - Keep care items and personal belongings within reach  - Initiate and maintain comfort rounds  - Make Fall Risk Sign visible to staff  - Apply yellow socks and bracelet for high fall risk patients  - Consider moving patient to room near nurses station  Outcome: Progressing  Goal: Maintain or return to baseline ADL function  Description: INTERVENTIONS:  -  Assess patient's ability to carry out ADLs; assess patient's baseline for ADL function and identify physical deficits which impact ability to perform ADLs (bathing, care of mouth/teeth, toileting, grooming, dressing, etc.)  - Assess/evaluate cause of self-care deficits   - Assess range of motion  - Assess patient's mobility; develop plan if impaired  - Assess patient's need for assistive devices and provide as appropriate  - Encourage maximum independence but intervene and supervise when necessary  - Involve family in performance of ADLs  - Assess for home care needs following discharge   - Consider OT consult to assist with ADL evaluation and planning for discharge  - Provide patient education as appropriate  Outcome: Progressing  Goal: Maintains/Returns to pre admission functional level  Description: INTERVENTIONS:  - Perform BMAT or MOVE assessment daily.   - Set and communicate daily mobility goal to care team and patient/family/caregiver.    - Collaborate with rehabilitation services on mobility goals if consulted  - Out of bed for toileting  - Record patient progress and toleration of activity level   Outcome: Progressing     Problem: DISCHARGE PLANNING  Goal: Discharge to home or other facility with appropriate resources  Description: INTERVENTIONS:  - Identify barriers to discharge w/patient and caregiver  - Arrange for needed discharge resources and transportation as appropriate  - Identify discharge learning needs (meds, wound care, etc.)  - Arrange for interpretive services to assist at discharge as needed  - Refer to Case Management Department for coordinating discharge planning if the patient needs post-hospital services based on physician/advanced practitioner order or complex needs related to functional status, cognitive ability, or social support system  Outcome: Progressing     Problem: Nutrition/Hydration-ADULT  Goal: Nutrient/Hydration intake appropriate for improving, restoring or maintaining nutritional needs  Description: Monitor and assess patient's nutrition/hydration status for malnutrition. Collaborate with interdisciplinary team and initiate plan and interventions as ordered. Monitor patient's weight and dietary intake as ordered or per policy. Utilize nutrition screening tool and intervene as necessary. Determine patient's food preferences and provide high-protein, high-caloric foods as appropriate.      INTERVENTIONS:  - Monitor oral intake, urinary output, labs, and treatment plans  - Assess nutrition and hydration status and recommend course of action  - Evaluate amount of meals eaten  - Assist patient with eating if necessary   - Allow adequate time for meals  - Recommend/ encourage appropriate diets, oral nutritional supplements, and vitamin/mineral supplements  - Order, calculate, and assess calorie counts as needed  - Recommend, monitor, and adjust tube feedings and TPN/PPN based on assessed needs  - Assess need for intravenous fluids  - Provide specific nutrition/hydration education as appropriate  - Include patient/family/caregiver in decisions related to nutrition  Outcome: Progressing     Problem: POSTPARTUM  Goal: Experiences normal postpartum course  Description: INTERVENTIONS:  - Monitor maternal vital signs  - Assess uterine involution and lochia  Outcome: Progressing  Goal: Appropriate maternal -  bonding  Description: INTERVENTIONS:  - Identify family support  - Assess for appropriate maternal/infant bonding   -Encourage maternal/infant bonding opportunities  - Referral to  or  as needed  Outcome: Progressing  Goal: Establishment of infant feeding pattern  Description: INTERVENTIONS:  - Assess breast/bottle feeding  - Refer to lactation as needed  Outcome: Progressing  Goal: Incision(s), wounds(s) or drain site(s) healing without S/S of infection  Description: INTERVENTIONS  - Assess and document dressing, incision, wound bed, drain sites and surrounding tissue  Outcome: Progressing

## 2023-09-06 NOTE — DISCHARGE SUMMARY
Discharge Summary - OB/GYN   Anderson Torrez 28 y.o. female MRN: [id number]  Unit/Bed#: -01 Encounter: [id number]    Admission Date: 2023     Discharge Date: 23    Principal Diagnosis: Abdominal pain in pregnancy, third trimester [O26.893, R10.9], [id number] Pregnancy at 40w0d    Secondary Diagnosis: none    Attending - Delivery: Ally Jones, DO         - Discharge: Benito Kelly MD    Procedures: Vaginal, Spontaneous      Anesthesia: epidural    Complications: none apparent    Hospital Course:      Anderson Torrez is a 28 y.o. [id number] at 40w0d who was admitted for labor. She delivered a viable male  with weight of 8lbs, 2.2oz, apgars of 9 (1 min) and 9 (5 min).  was transferred to  nursery. Patient tolerated the procedure well and was transferred to recovery in stable condition. Her postpartum course was uncomplicated. Admission hemoglobin was 12.5g/dl, postpartum was 11.1g/dl. On day of discharge, she was ambulating and able to reasonably perform all ADLs. She was voiding and had appropriate bowel function. Pain was well controlled. She was discharged home on postpartum day #2 without complications. Patient was instructed to follow up with her OB as an outpatient and was given appropriate warnings to call provider if she develops signs of infection or uncontrolled pain. Condition at discharge: good     Discharge instructions/Information to patient and family:   See after visit summary for information provided to patient and family. Provisions for Follow-Up Care:  See after visit summary for information related to follow-up care and any pertinent home health orders. Disposition: Home    Planned Readmission: No    Discharge Medications: For a complete list of the patient's medications, please refer to her med rec.     Benito Kelly MD

## 2023-09-06 NOTE — PROGRESS NOTES
Progress Note - OB/GYN  Post-Partum Physician Note   Rehan Field 28 y.o. female MRN: [id number]  Unit/Bed#: -01 Encounter: [id number]    Patient is postpartum day 2 from a Vaginal, Spontaneous  with mp laceration and Anesthesia: epidural    Subjective:   Pain: no  Tolerating Oral Intake: yes  Voiding: yes  Flatus: yes  Bowel Movement: yes  Ambulating: yes  Breastfeeding: Breastfeeding  Shortness of Breath: no  Leg Pain/Discomfort: no  Lochia: normal      Objective:   Vitals:    09/05/23 0802 09/05/23 1500 09/05/23 2313 09/06/23 0718   BP: 115/83 98/67 106/70 114/65   BP Location: Right arm Left arm Left arm Left arm   Pulse: 98 74 82 77   Resp: 16 16 18 18   Temp: 97.7 °F (36.5 °C) 98 °F (36.7 °C) 97.9 °F (36.6 °C) 98.2 °F (36.8 °C)   TempSrc: Oral Oral Oral Temporal   SpO2: 97% 95% 95% 96%   Weight:       Height:         No intake or output data in the 24 hours ending 09/06/23 0850    Physical Exam:  General: in no apparent distress  Abdomen: abdomen is soft without significant tenderness  Fundus: Firm and non-tender, 1 below the umbilicus  Perineum: exam deferred  Lower extremities: nontender      Labs/Tests:   Lab Results   Component Value Date    WBC 14.48 (H) 09/05/2023    HGB 11.1 (L) 09/05/2023    HCT 33.7 (L) 09/05/2023    MCV 89 09/05/2023     09/05/2023       Brief OB Lab review:  ABO Grouping   Date Value Ref Range Status   09/04/2023 B  Final      Rh Factor   Date Value Ref Range Status   09/04/2023 Positive  Final     Rh Type   Date Value Ref Range Status   03/03/2023 Positive  Final     Comment:     Please note: Prior records for this patient's ABO / Rh type are not  available for additional verification.       No results found for: "ANTIBODYSCR"  No results found for: "RUBM"    MEDS:   Current Facility-Administered Medications   Medication Dose Route Frequency   • acetaminophen (TYLENOL) tablet 650 mg  650 mg Oral Q4H PRN   • benzocaine-menthol-lanolin-aloe (DERMOPLAST) 20-0.5 % topical spray 1 Application  1 Application Topical V6J PRN   • bupivacaine (PF) (MARCAINE) 0.25 % injection 30 mL  30 mL Infiltration Once PRN   • calcium carbonate (TUMS) chewable tablet 1,000 mg  1,000 mg Oral Daily PRN   • docusate sodium (COLACE) capsule 100 mg  100 mg Oral BID   • hydrocortisone 1 % cream 1 Application  1 Application Topical Daily PRN   • ibuprofen (MOTRIN) tablet 600 mg  600 mg Oral Q6H   • lactated ringers infusion  125 mL/hr Intravenous Continuous   • ondansetron (ZOFRAN) injection 4 mg  4 mg Intravenous Q8H PRN   • oxyCODONE (ROXICODONE) IR tablet 5 mg  5 mg Oral Q3H PRN   • ropivacaine 0.2% PCEA   Epidural Continuous   • witch hazel-glycerin (TUCKS) topical pad 1 Pad  1 Pad Topical Q4H PRN     Invasive Devices     None                 Assessment and Plan:  28y.o. year-old O1O1432, postpartum day 2 status-post  Vaginal, Spontaneous  .     Continue routine postpartum care  Encourage ambulation    Planning discharge today        Gabino Hannon MD

## 2023-09-07 NOTE — UTILIZATION REVIEW
MOTHER AND BABY DISCHARGE     NOTIFICATION OF INPATIENT ADMISSION   MATERNITY/DELIVERY AUTHORIZATION REQUEST   SERVICING FACILITY:   [address] [place] [address] - L&D, , Timmy Jabier Jones, [address] [place] [address]  Tax ID: [id number]  NPI: [id number] ATTENDING PROVIDER:  Attending Name and NPI#: William Caldwell [[id number]]  Address: [address] [place] [address]  Phone: [phone]     ADMISSION INFORMATION:  Place of Service: Inpatient [address] Code: 21  Inpatient Admission Date/Time: 23  3:35 AM  Discharge Date/Time: 2023  1:41 PM  Admitting Diagnosis Code/Description:  Abdominal pain in pregnancy, third trimester [O26.893, R10.9]     Mother: Miroslava Chang 1990 Estimated Date of Delivery: 23  Delivering clinician: Lesley Coronado    OB History        3    Para   2    Term   2            AB   1    Living   2       SAB        IAB        Ectopic        Multiple   0    Live Births   2                Name & MRN:   Information for the patient's :  Levi Mehta) [[id number]]      Delivery Information:  Sex: male  Delivered 2023 8:07 AM by Vaginal, Spontaneous; Gestational Age: 37w0d    Electra Measurements:  Weight: 8 lb 2.2 oz (3690 g); Height: 20.75"    APGAR 1 minute 5 minutes 10 minutes   Totals: 9 9      [facility] Birth Information: 28 y.o. female MRN: [id number] Unit/Bed#: -01   Birthweight: No birth weight on file. Gestational Age: <None> Delivery Type:    APGARS Totals:        UTILIZATION REVIEW CONTACT:  Alesia Tomlinson Utilization   Network Utilization Review Department  Phone: [phone]  Fax [phone]  Email: [email or web address]  Contact for approvals/pending authorizations, clinical reviews, and discharge.      PHYSICIAN ADVISORY SERVICES:  Medical Necessity Denial & Qypj-xh-Cede Review  Phone: [phone]  Fax: [phone]  Email: [email or web address]

## 2023-09-12 LAB — PLACENTA IN STORAGE: NORMAL

## 2023-09-20 ENCOUNTER — HOSPITAL ENCOUNTER (EMERGENCY)
Facility: HOSPITAL | Age: 33
Discharge: HOME | End: 2023-09-20
Attending: EMERGENCY MEDICINE | Admitting: EMERGENCY MEDICINE
Payer: COMMERCIAL

## 2023-09-20 ENCOUNTER — APPOINTMENT (EMERGENCY)
Dept: ULTRASOUND IMAGING | Facility: HOSPITAL | Age: 33
End: 2023-09-20
Payer: COMMERCIAL

## 2023-09-20 ENCOUNTER — TELEPHONE (OUTPATIENT)
Dept: OBGYN CLINIC | Facility: CLINIC | Age: 33
End: 2023-09-20

## 2023-09-20 ENCOUNTER — TELEPHONE (OUTPATIENT)
Dept: LABOR AND DELIVERY | Facility: HOSPITAL | Age: 33
End: 2023-09-20

## 2023-09-20 VITALS
OXYGEN SATURATION: 98 % | TEMPERATURE: 98.8 F | DIASTOLIC BLOOD PRESSURE: 82 MMHG | RESPIRATION RATE: 18 BRPM | SYSTOLIC BLOOD PRESSURE: 115 MMHG | HEART RATE: 87 BPM

## 2023-09-20 DIAGNOSIS — N93.9 ABNORMAL VAGINAL BLEEDING: Primary | ICD-10-CM

## 2023-09-20 LAB
ALBUMIN SERPL BCP-MCNC: 4 G/DL (ref 3.5–5)
ALP SERPL-CCNC: 84 U/L (ref 34–104)
ALT SERPL W P-5'-P-CCNC: 41 U/L (ref 7–52)
ANION GAP SERPL CALCULATED.3IONS-SCNC: 4 MMOL/L
AST SERPL W P-5'-P-CCNC: 28 U/L (ref 13–39)
B-HCG SERPL-ACNC: 3 MIU/ML (ref 0–5)
BASOPHILS # BLD AUTO: 0.06 THOUSANDS/ÂΜL (ref 0–0.1)
BASOPHILS NFR BLD AUTO: 1 % (ref 0–1)
BILIRUB SERPL-MCNC: 0.38 MG/DL (ref 0.2–1)
BUN SERPL-MCNC: 8 MG/DL (ref 5–25)
CALCIUM SERPL-MCNC: 9.6 MG/DL (ref 8.4–10.2)
CHLORIDE SERPL-SCNC: 104 MMOL/L (ref 96–108)
CO2 SERPL-SCNC: 28 MMOL/L (ref 21–32)
CREAT SERPL-MCNC: 0.55 MG/DL (ref 0.6–1.3)
EOSINOPHIL # BLD AUTO: 0.17 THOUSAND/ÂΜL (ref 0–0.61)
EOSINOPHIL NFR BLD AUTO: 3 % (ref 0–6)
ERYTHROCYTE [DISTWIDTH] IN BLOOD BY AUTOMATED COUNT: 14.1 % (ref 11.6–15.1)
GFR SERPL CREATININE-BSD FRML MDRD: 122 ML/MIN/1.73SQ M
GLUCOSE SERPL-MCNC: 79 MG/DL (ref 65–140)
HCT VFR BLD AUTO: 40.3 % (ref 34.8–46.1)
HGB BLD-MCNC: 12.9 G/DL (ref 11.5–15.4)
IMM GRANULOCYTES # BLD AUTO: 0.05 THOUSAND/UL (ref 0–0.2)
IMM GRANULOCYTES NFR BLD AUTO: 1 % (ref 0–2)
LIPASE SERPL-CCNC: 35 U/L (ref 11–82)
LYMPHOCYTES # BLD AUTO: 2.15 THOUSANDS/ÂΜL (ref 0.6–4.47)
LYMPHOCYTES NFR BLD AUTO: 36 % (ref 14–44)
MCH RBC QN AUTO: 28.6 PG (ref 26.8–34.3)
MCHC RBC AUTO-ENTMCNC: 32 G/DL (ref 31.4–37.4)
MCV RBC AUTO: 89 FL (ref 82–98)
MONOCYTES # BLD AUTO: 0.38 THOUSAND/ÂΜL (ref 0.17–1.22)
MONOCYTES NFR BLD AUTO: 6 % (ref 4–12)
NEUTROPHILS # BLD AUTO: 3.19 THOUSANDS/ÂΜL (ref 1.85–7.62)
NEUTS SEG NFR BLD AUTO: 53 % (ref 43–75)
NRBC BLD AUTO-RTO: 0 /100 WBCS
PLATELET # BLD AUTO: 449 THOUSANDS/UL (ref 149–390)
PMV BLD AUTO: 9.6 FL (ref 8.9–12.7)
POTASSIUM SERPL-SCNC: 4 MMOL/L (ref 3.5–5.3)
PROT SERPL-MCNC: 7.1 G/DL (ref 6.4–8.4)
RBC # BLD AUTO: 4.51 MILLION/UL (ref 3.81–5.12)
SODIUM SERPL-SCNC: 136 MMOL/L (ref 135–147)
WBC # BLD AUTO: 6 THOUSAND/UL (ref 4.31–10.16)

## 2023-09-20 PROCEDURE — 80053 COMPREHEN METABOLIC PANEL: CPT | Performed by: EMERGENCY MEDICINE

## 2023-09-20 PROCEDURE — 76830 TRANSVAGINAL US NON-OB: CPT

## 2023-09-20 PROCEDURE — 76856 US EXAM PELVIC COMPLETE: CPT

## 2023-09-20 PROCEDURE — 83690 ASSAY OF LIPASE: CPT | Performed by: EMERGENCY MEDICINE

## 2023-09-20 PROCEDURE — 99284 EMERGENCY DEPT VISIT MOD MDM: CPT

## 2023-09-20 PROCEDURE — 99284 EMERGENCY DEPT VISIT MOD MDM: CPT | Performed by: PHYSICIAN ASSISTANT

## 2023-09-20 PROCEDURE — 85025 COMPLETE CBC W/AUTO DIFF WBC: CPT | Performed by: EMERGENCY MEDICINE

## 2023-09-20 PROCEDURE — 36415 COLL VENOUS BLD VENIPUNCTURE: CPT

## 2023-09-20 PROCEDURE — NC001 PR NO CHARGE: Performed by: OBSTETRICS & GYNECOLOGY

## 2023-09-20 PROCEDURE — 84702 CHORIONIC GONADOTROPIN TEST: CPT | Performed by: PHYSICIAN ASSISTANT

## 2023-09-20 RX ORDER — METHYLERGONOVINE MALEATE 0.2 MG/1
0.2 TABLET ORAL 3 TIMES DAILY
Qty: 6 TABLET | Refills: 0 | Status: SHIPPED | OUTPATIENT
Start: 2023-09-20 | End: 2023-09-22

## 2023-09-20 NOTE — ED NOTES
Pt had vaginal delivery on 9/4 stopped bleeding and then started up again with large clots. C/o slight cramping in lower abd. Currently pumping. Denies lightheadedness or dizziness.   Lab called will add on hcg to blood work already received, updated on plan of care awaiting US to be performed     Alexia Gustafson RN  09/20/23 [id number]

## 2023-09-20 NOTE — TELEPHONE ENCOUNTER
Telephone call:    I called Guanaco Jackson and we spoke. She passed another clot in the shower when she got home. She will continue to self-survey. I reviewed precautions with her, and let her know that if she needs to, we want to see her back here. We reviewed again breastfeeding and methergine. We reviewed pump-and-dump while taking, up until 12 hours after last dose, and she stated her understanding. I dicussed with her that office staff will reach out to her to schedule appointment for Friday 9/22. She stated her understanding and thanks.     Sola Neville MD

## 2023-09-20 NOTE — DISCHARGE INSTRUCTIONS
Please return with any worsening symptoms questions comments or concerns    Follow-up with your OB/GYN on Friday as instructed by your gynecologist today

## 2023-09-20 NOTE — TELEPHONE ENCOUNTER
SEE ER notes and TE from Dr. Danna Leija. Pt called the office informing she was prescribed Methergine in the ER and wanted to confirm she is to start the Methergine prior to being seen in the office tomorrow with Dr. Debra Edwards. Discussed with patient that Dr. Leonardo Kirkpatrick and reviewed her US and ordered the [address] Also, Dr. Danna Leija spoke with patient by phone at 1:08 pm. Pt informed she did receive a call from Dr. Danna Leija to review plan. Pt reports her bleeding is consistent with a period flow, passed 1 clot upon arrival home (golf ball size clot)  but no further clots thus far. instructed to keep her appointment scheduled for tomorrow and reinforced bleeding precautions, and instructed to return to ER if her bleeding should worsen, begins to pass clots or becomes symptomatic. Encouraged to push fluids. Pt also questioned if she can continue to breast feeding, reviewed directions to pump-and-dump while taking, up until 12 hours after last dose, and may also check with pediatrician.

## 2023-09-20 NOTE — ED PROVIDER NOTES
History  Chief Complaint   Patient presents with   • Vaginal Bleeding     Pt had recent vaginal birth on 9/4 and stopped bleeding about a week ago, pt states she started bleeding on Sunday but this morning pt started passing clots the size of limes. Pt has mild cramping. Pt denies dizziness, lightheaded or abd pain. This is a 27-year-old female patient who had a vaginal delivery on 9 /0/5699 without complication. The bleeding had stopped. She states approximately 3 days ago the bleeding started again and now she is passing clots the size of a lemon she has minimal cramping and does not describe pain. Nothing makes this better or worse she called OB/GYN and they told her to come to the ER for further evaluation. Patient is currently breast-feeding. Denies any fever chills headache blurred vision double vision cough congestion sore throat no nausea vomiting diarrhea abdominal pain no chest pain or shortness of breath no urgency frequency dysuria. Differential diagnosis includes not limited to products of conception, abnormal vaginal bleeding          Prior to Admission Medications   Prescriptions Last Dose Informant Patient Reported? Taking?    B Complex Vitamins (B COMPLEX 1 PO)  Self Yes No   Cholecalciferol (Vitamin D) 50 MCG (2000 UT) CAPS  Self Yes No   Sig: every 24 hours   Collagen-Vitamin C-Biotin (COLLAGEN 1500/C PO)  Self Yes No   Doxylamine Succinate, Sleep, (UNISOM PO)  Self Yes No   Sig: Take 1 tablet by mouth daily at bedtime   Magnesium 100 MG CAPS  Self Yes No   Prenatal Vit-Fe Fumarate-FA (PRENATAL VITAMINS PO)  Self Yes No   Sig: Take 1 tablet by mouth Daily at 2am   acetaminophen (TYLENOL) 325 mg tablet   No No   Sig: Take 2 tablets (650 mg total) by mouth every 6 (six) hours as needed for mild pain   ascorbic acid (VITAMIN C) 500 mg tablet  Self Yes No   Sig: as directed Orally   ibuprofen (MOTRIN) 600 mg tablet   No No   Sig: Take 1 tablet (600 mg total) by mouth every 6 (six) hours as needed for mild pain   vitamin B-12 (VITAMIN B-12) 1,000 mcg tablet  Self Yes No   Sig: every 24 hours      Facility-Administered Medications: None       Past Medical History:   Diagnosis Date   • Abnormal Pap smear of cervix    • Migraine        Past Surgical History:   Procedure Laterality Date   • WISDOM TOOTH EXTRACTION N/A 2004       Family History   Problem Relation Age of Onset   • Breast cancer Paternal Grandmother    • Uterine cancer Neg Hx    • Ovarian cancer Neg Hx    • Colon cancer Neg Hx      I have reviewed and agree with the history as documented. E-Cigarette/Vaping   • E-Cigarette Use Never User      E-Cigarette/Vaping Substances   • Nicotine No    • THC No    • CBD No    • Flavoring No    • Other No    • Unknown No      Social History     Tobacco Use   • Smoking status: Never     Passive exposure: Never   • Smokeless tobacco: Never   Vaping Use   • Vaping Use: Never used   Substance Use Topics   • Alcohol use: Not Currently   • Drug use: Never       Review of Systems   Constitutional: Negative for chills, diaphoresis, fatigue and fever. HENT: Negative for congestion, ear pain, nosebleeds and sore throat. Eyes: Negative for photophobia, pain, discharge and visual disturbance. Respiratory: Negative for cough, choking, chest tightness, shortness of breath and wheezing. Cardiovascular: Negative for chest pain and palpitations. Gastrointestinal: Negative for abdominal distention, abdominal pain, diarrhea and vomiting. Genitourinary: Positive for vaginal bleeding. Negative for decreased urine volume, difficulty urinating, dysuria, enuresis, flank pain, frequency, genital sores, hematuria, menstrual problem, pelvic pain, urgency, vaginal discharge and vaginal pain. Mild cramping in the pelvis no real pain   Musculoskeletal: Negative for arthralgias, back pain, gait problem and joint swelling. Skin: Negative for color change and rash.    Neurological: Negative for dizziness, seizures, syncope and headaches. Psychiatric/Behavioral: Negative for behavioral problems and confusion. The patient is not nervous/anxious. All other systems reviewed and are negative. Physical Exam  Physical Exam  Vitals and nursing note reviewed. Constitutional:       General: She is not in acute distress. Appearance: She is not ill-appearing, toxic-appearing or diaphoretic. HENT:      Head: Normocephalic and atraumatic. Right Ear: Tympanic membrane, ear canal and external ear normal.      Left Ear: Tympanic membrane, ear canal and external ear normal.      Nose: Nose normal. No congestion or rhinorrhea. Mouth/Throat:      Mouth: Mucous membranes are dry. Pharynx: Oropharynx is clear. No oropharyngeal exudate or posterior oropharyngeal erythema. Eyes:      Extraocular Movements: Extraocular movements intact. Conjunctiva/sclera: Conjunctivae normal.      Pupils: Pupils are equal, round, and reactive to light. Cardiovascular:      Rate and Rhythm: Normal rate and regular rhythm. Pulmonary:      Effort: Pulmonary effort is normal. No respiratory distress. Breath sounds: Normal breath sounds. Abdominal:      General: Bowel sounds are normal.      Palpations: Abdomen is soft. Tenderness: There is no abdominal tenderness. Musculoskeletal:         General: Normal range of motion. Cervical back: Normal range of motion and neck supple. No rigidity or tenderness. Right lower leg: No edema. Left lower leg: No edema. Lymphadenopathy:      Cervical: No cervical adenopathy. Skin:     General: Skin is warm and dry. Capillary Refill: Capillary refill takes less than 2 seconds. Findings: No rash. Neurological:      General: No focal deficit present. Mental Status: She is oriented to person, place, and time. Mental status is at baseline.    Psychiatric:         Mood and Affect: Mood normal.         Behavior: Behavior normal.         Vital Signs  ED Triage Vitals   Temperature Pulse Respirations Blood Pressure SpO2   09/20/23 0906 09/20/23 0905 09/20/23 0905 09/20/23 0905 09/20/23 0905   98.8 °F (37.1 °C) 87 18 115/82 98 %      Temp Source Heart Rate Source Patient Position - Orthostatic VS BP Location FiO2 (%)   09/20/23 0906 -- -- -- --   Oral          Pain Score       --                  Vitals:    09/20/23 0905   BP: 115/82   Pulse: 87         Visual Acuity      ED Medications  Medications - No data to display    Diagnostic Studies  Results Reviewed     Procedure Component Value Units Date/Time    Quantitative hCG [[id number]]  (Normal) Collected: 09/20/23 0913    Lab Status: Final result Specimen: Blood from Arm, Right Updated: 09/20/23 1033     HCG, Quant 3 mIU/mL     Narrative:       Expected Ranges:    HCG results between 5 and 25 mIU/mL may be indicative of early pregnancy but should be interpreted in light of the total clinical presentation. HCG can rise to detectable levels in codie and post menopausal women (0-11.6 mIU/mL).      Approximate               Approximate HCG  Gestation age          Concentration ( mIU/mL)  _____________          ______________________   Heriberto Stewart                      HCG values  0.2-1                       5-50  1-2                           2-3                         100-5000  3-4                         500-58809  4-5                         1000-70356  5-6                         55795-240035  6-8                         [id number]  8-12                        [id number]      Comprehensive metabolic panel [[id number]]  (Abnormal) Collected: 09/20/23 0913    Lab Status: Final result Specimen: Blood from Arm, Right Updated: 09/20/23 0937     Sodium 136 mmol/L      Potassium 4.0 mmol/L      Chloride 104 mmol/L      CO2 28 mmol/L      ANION GAP 4 mmol/L      BUN 8 mg/dL      Creatinine 0.55 mg/dL      Glucose 79 mg/dL      Calcium 9.6 mg/dL      AST 28 U/L      ALT 41 U/L      Alkaline Phosphatase 84 U/L Total Protein 7.1 g/dL      Albumin 4.0 g/dL      Total Bilirubin 0.38 mg/dL      eGFR 122 ml/min/1.73sq m     Narrative:      [place] guidelines for Chronic Kidney Disease (CKD):   •  Stage 1 with normal or high GFR (GFR > 90 mL/min/1.73 square meters)  •  Stage 2 Mild CKD (GFR = 60-89 mL/min/1.73 square meters)  •  Stage 3A Moderate CKD (GFR = 45-59 mL/min/1.73 square meters)  •  Stage 3B Moderate CKD (GFR = 30-44 mL/min/1.73 square meters)  •  Stage 4 Severe CKD (GFR = 15-29 mL/min/1.73 square meters)  •  Stage 5 End Stage CKD (GFR <15 mL/min/1.73 square meters)  Note: GFR calculation is accurate only with a steady state creatinine    Lipase [[id number]]  (Normal) Collected: 09/20/23 0913    Lab Status: Final result Specimen: Blood from Arm, Right Updated: 09/20/23 0937     Lipase 35 u/L     CBC and differential [[id number]]  (Abnormal) Collected: 09/20/23 0913    Lab Status: Final result Specimen: Blood from Arm, Right Updated: 09/20/23 0918     WBC 6.00 Thousand/uL      RBC 4.51 Million/uL      Hemoglobin 12.9 g/dL      Hematocrit 40.3 %      MCV 89 fL      MCH 28.6 pg      MCHC 32.0 g/dL      RDW 14.1 %      MPV 9.6 fL      Platelets 967 Thousands/uL      nRBC 0 /100 WBCs      Neutrophils Relative 53 %      Immat GRANS % 1 %      Lymphocytes Relative 36 %      Monocytes Relative 6 %      Eosinophils Relative 3 %      Basophils Relative 1 %      Neutrophils Absolute 3.19 Thousands/µL      Immature Grans Absolute 0.05 Thousand/uL      Lymphocytes Absolute 2.15 Thousands/µL      Monocytes Absolute 0.38 Thousand/µL      Eosinophils Absolute 0.17 Thousand/µL      Basophils Absolute 0.06 Thousands/µL     UA w Reflex to Microscopic w Reflex to Culture [[id number]]     Lab Status: No result Specimen: Urine                  US pelvis complete w transvaginal   Final Result by Thanh Varela MD (09/20 1111)      Heterogeneous thickened endometrium measuring up to 15 mm.  Retained products of conception cannot be excluded. Workstation performed: [id number]                    Procedures  Procedures         ED Course  ED Course as of 09/20/23 1348   Wed Sep 20, 2023   1141 Patient seen and evaluated by OB they reviewed the ultrasound they feel patient can be discharged on Methergine 0.2 mg 1 p.o. 3 times daily x2 days and follow-up in the office on Friday. Was given strict return precautions by the OB/GYN Dr. Jannie Amaya Making  69-year-old female patient who presents with abnormal vaginal bleeding. She delivered on the fourth of this month stop bleeding and then as of 3 days ago started vaginal bleeding which she described as lime sized clots. No real pain she states she has cramping of breast-feeding. She is currently breast-feeding. There is been no fever chills headache blurred vision double vision colchicine sore throat nausea and diarrhea. No abdominal pain no chest pain or shortness of breath. Nothing makes it better or worse she called her OB/GYN doctor and they sent her in for further evaluation. Differential diagnose includes not limited to abnormal vaginal bleeding, dysfunctional vaginal bleeding, products of retention    Abnormal vaginal bleeding: acute illness or injury     Details: I consulted OB/GYN who saw the patient bedside and evaluated. The quant was 3 the ultrasound stated he could not rule out products of conception but OB/GYN and myself did not believe that it was. They recommended Methergine 0.2 mg 3 times daily x2 days and follow-up this Friday. Amount and/or Complexity of Data Reviewed  External Data Reviewed: notes. Details: I did review previous notes of delivery to gain past medical history and history of present illness  Labs: ordered. Decision-making details documented in ED Course. Details:  All reviewed patient was hemodynamically stable there is nothing to intervene. Radiology: ordered. Decision-making details documented in ED Course. Details: I reviewed read    IMPRESSION:     Heterogeneous thickened endometrium measuring up to 15 mm. Retained products of conception cannot be excluded.        Discussion of management or test interpretation with external provider(s): Using joint decision-making with the patient and Dr. Yeyo Remy from OB/GYN patient will be placed on pathogen 0.2 mg twice daily and follow-up with OB/GYN on Friday return with worsening symptoms questions comments or concerns verbalized understanding and agreement    Risk  Prescription drug management. Disposition  Final diagnoses:   Abnormal vaginal bleeding     Time reflects when diagnosis was documented in both MDM as applicable and the Disposition within this note     Time User Action Codes Description Comment    9/20/2023 11:14 AM Darwin Hernández Add [N93.9] Abnormal vaginal bleeding       ED Disposition     ED Disposition   Discharge    Condition   Stable    Date/Time   Wed Sep 20, 2023 11:41 AM    Comment   Ruben Franklin discharge to home/self care.                Follow-up Information     Follow up With Specialties Details Why Contact Info Additional Information    [facility] OB[facility]N [facility] Obstetrics and Gynecology On 9/22/2023  [address] [place], [place] [id number] [phone]  [facility] OB/GYN [facility], [address] [place] [address], [id number], [phone]          Discharge Medication List as of 9/20/2023 11:43 AM      START taking these medications    Details   methylergonovine (METHERGINE) 0.2 mg tablet Take 1 tablet (0.2 mg total) by mouth 3 (three) times a day for 2 days, Starting Wed 9/20/2023, Until Fri 9/22/2023, Normal         CONTINUE these medications which have NOT CHANGED    Details   acetaminophen (TYLENOL) 325 mg tablet Take 2 tablets (650 mg total) by mouth every 6 (six) hours as needed for mild pain, Starting Wed 9/6/2023, No Print      ascorbic acid (VITAMIN C) 500 mg tablet as directed Orally, Historical Med      B Complex Vitamins (B COMPLEX 1 PO) Historical Med      Cholecalciferol (Vitamin D) 50 MCG (2000 UT) CAPS every 24 hours, Historical Med      Collagen-Vitamin C-Biotin (COLLAGEN 1500/C PO) Historical Med      Doxylamine Succinate, Sleep, (UNISOM PO) Take 1 tablet by mouth daily at bedtime, Historical Med      ibuprofen (MOTRIN) 600 mg tablet Take 1 tablet (600 mg total) by mouth every 6 (six) hours as needed for mild pain, Starting Wed 9/6/2023, No Print      Magnesium 100 MG CAPS Historical Med      Prenatal Vit-Fe Fumarate-FA (PRENATAL VITAMINS PO) Take 1 tablet by mouth Daily at 2am, Historical Med      vitamin B-12 (VITAMIN B-12) 1,000 mcg tablet every 24 hours, Historical Med             No discharge procedures on file.     PDMP Review       Value Time User    PDMP Reviewed  Yes 9/5/2023  9:15 AM Jessica Cr MD          ED Provider  Electronically Signed by           Estela Moreno PA-C  09/20/23 6644

## 2023-09-20 NOTE — CONSULTS
Ob/Gyn Consult  Pari cMkeon 28 y.o. female MRN: [id number]  Unit/Bed#: ED 02 Encounter: [id number]    A/P. 28 y.o. G2C8458, here with vaginal bleeding. (1) Vaginal bleeding. Passed clots at home. Now with minimal ongoing bleeding. Ultrasound with EMS 15mm. This finding may be associated with retained PoC, but it has low (~30%) specificity, and can be associated with simply blood/clot as well. The cervix is closed, the bleeding is minimal.  VS are normal.  She is afebrile. WBC is 6, and Hgb is 12.9, up from 11.1 on . Suspect perhaps a small amount of hematometra, though not able to definitively rule out a small amount of retained PoC. I discussed all of this with Erlinda De La Cruz, and we reviewed options, including conservative, medical, and surgical.  I recommended medical for now, and she agrees with this. For now, will give methergine po, observe closely. If bleeding increases or other worrisome S/Sx develop, may ultimately need D&C. She is breastfeeding, and we discussed the recommendation to avoid breastfeeding for 12 hours following the last dose of methylergonovine. She will start the methergine now, and "pump and dump" for the period she is on it, until 12 hours after the last dose. --> Recommend discharge home. --> Methergine 0.2mg po tid x 2 days. --> Pump and discard while taking methergine, up to 12 hours after last dose. --> Precautions discussed with her in detail. --> Follow up in the office 23. I called the office; staff there will contact Erlinda De La Cruz to arrange followup on 23. Dylan Flores MD  23  Case and plan discussed with, and ultrasound reviewed with on-call for [facility], Dr. Char Munoz. Delfino Wilson   -------------------------------------------------------------------------------------------------------  CC/    "I am bleeding."    HPI/    Status-post  2023 at 40.0 weeks gestational age of a healthy baby boy, apgars  over an intact perineum. Discharged home 23, and has been doing well. VB / lochia gradually decreased. Last week, was dark brown and minimal.  On , noted bright red bleeding, small amount. Today, passed 3 "lime-sized" clots. Had some cramping with this. Now, reports continued bleeding, minimal cramping. No dizzy/LH/CP/SoB. No fever/chills. Breastfeeding/bonding. Allergies/      Allergies as of 2023 - Reviewed 2023   Allergen Reaction Noted   • Amoxicillin Hives 2022       Rx/      No current facility-administered medications on file prior to encounter.      Current Outpatient Medications on File Prior to Encounter   Medication Sig Dispense Refill   • acetaminophen (TYLENOL) 325 mg tablet Take 2 tablets (650 mg total) by mouth every 6 (six) hours as needed for mild pain  0   • ascorbic acid (VITAMIN C) 500 mg tablet as directed Orally     • B Complex Vitamins (B COMPLEX 1 PO)      • Cholecalciferol (Vitamin D) 50 MCG (2000 UT) CAPS every 24 hours     • Collagen-Vitamin C-Biotin (COLLAGEN 1500/C PO)      • Doxylamine Succinate, Sleep, (UNISOM PO) Take 1 tablet by mouth daily at bedtime     • ibuprofen (MOTRIN) 600 mg tablet Take 1 tablet (600 mg total) by mouth every 6 (six) hours as needed for mild pain  0   • Magnesium 100 MG CAPS      • Prenatal Vit-Fe Fumarate-FA (PRENATAL VITAMINS PO) Take 1 tablet by mouth Daily at 2am     • vitamin B-12 (VITAMIN B-12) 1,000 mcg tablet every 24 hours         PMH/      Past Medical History:   Diagnosis Date   • Abnormal Pap smear of cervix    • Migraine        PSH/      Past Surgical History:   Procedure Laterality Date   • WISDOM TOOTH EXTRACTION N/A        ObHx/    [id number]:        OB History    Para Term  AB Living   3 2 2 0 1 2   SAB IAB Ectopic Multiple Live Births   0 0 0 0 2      # Outcome Date GA Lbr Jaren/2nd Weight Sex Delivery Anes PTL Lv   3 Term 23 40w0d / 00:27 3690 g (8 lb 2.2 oz) M Vag-Spont EPI N MALIKA Name: [facility]      Jia Heaven: 9  Apgar5: 9   2 AB 11/2022 5w0d    SAB         Birth Comments: chemical pregnancy   1 Term 11/07/20 39w0d  3260 g (7 lb 3 oz) F Vag-Spont EPI N MALIKA     FH/    Family History   Problem Relation Age of Onset   • Breast cancer Paternal Grandmother    • Uterine cancer Neg Hx    • Ovarian cancer Neg Hx    • Colon cancer Neg Hx        SH/    She is . She works in pharmaceutical research. She does not use tobacco or illicit drugs, and uses alcohol only on occasion. Social History     Socioeconomic History   • Marital status: /Civil Union     Spouse name: Not on file   • Number of children: Not on file   • Years of education: Not on file   • Highest education level: Not on file   Occupational History   • Not on file   Tobacco Use   • Smoking status: Never     Passive exposure: Never   • Smokeless tobacco: Never   Vaping Use   • Vaping Use: Never used   Substance and Sexual Activity   • Alcohol use: Not Currently   • Drug use: Never   • Sexual activity: Not Currently     Partners: Male   Other Topics Concern   • Not on file   Social History Narrative   • Not on file     Social Determinants of Health     Financial Resource Strain: Not on file   Food Insecurity: Not on file   Transportation Needs: Not on file   Physical Activity: Not on file   Stress: Not on file   Social Connections: Not on file   Intimate Partner Violence: Not on file   Housing Stability: Not on file       RoS/    Constitutional: Negative    CV: Negative    Pulm: Negative    GI: Negative    Urinary: Negative    Neuro: Negative    Musculoskeletal: Negative    O/  /82   Pulse 87   Temp 98.8 °F (37.1 °C) (Oral)   Resp 18   LMP 11/28/2022 (Approximate) Comment: pt had delivery 9/4  SpO2 98%     Alert, comfortable, no acute distress    Regular rate and rhythm    Clear to auscultation bilaterally    Abdomen soft, nontender, nondistended.     Gyn/      Normal female external genitalia without lesions. Vault well-estrogenized, well-supported. Scant dark blood. No active bleeding. Cervix closed. Uterus 12wk size      No CMT, no uterine tenderness. No adnexal masses or tenderness to palpation.     Labs/  Recent Results (from the past 24 hour(s))   CBC and differential    Collection Time: 09/20/23  9:13 AM   Result Value Ref Range    WBC 6.00 4.31 - 10.16 Thousand/uL    RBC 4.51 3.81 - 5.12 Million/uL    Hemoglobin 12.9 11.5 - 15.4 g/dL    Hematocrit 40.3 34.8 - 46.1 %    MCV 89 82 - 98 fL    MCH 28.6 26.8 - 34.3 pg    MCHC 32.0 31.4 - 37.4 g/dL    RDW 14.1 11.6 - 15.1 %    MPV 9.6 8.9 - 12.7 fL    Platelets 011 (H) 591 - 390 Thousands/uL    nRBC 0 /100 WBCs    Neutrophils Relative 53 43 - 75 %    Immat GRANS % 1 0 - 2 %    Lymphocytes Relative 36 14 - 44 %    Monocytes Relative 6 4 - 12 %    Eosinophils Relative 3 0 - 6 %    Basophils Relative 1 0 - 1 %    Neutrophils Absolute 3.19 1.85 - 7.62 Thousands/µL    Immature Grans Absolute 0.05 0.00 - 0.20 Thousand/uL    Lymphocytes Absolute 2.15 0.60 - 4.47 Thousands/µL    Monocytes Absolute 0.38 0.17 - 1.22 Thousand/µL    Eosinophils Absolute 0.17 0.00 - 0.61 Thousand/µL    Basophils Absolute 0.06 0.00 - 0.10 Thousands/µL   Comprehensive metabolic panel    Collection Time: 09/20/23  9:13 AM   Result Value Ref Range    Sodium 136 135 - 147 mmol/L    Potassium 4.0 3.5 - 5.3 mmol/L    Chloride 104 96 - 108 mmol/L    CO2 28 21 - 32 mmol/L    ANION GAP 4 mmol/L    BUN 8 5 - 25 mg/dL    Creatinine 0.55 (L) 0.60 - 1.30 mg/dL    Glucose 79 65 - 140 mg/dL    Calcium 9.6 8.4 - 10.2 mg/dL    AST 28 13 - 39 U/L    ALT 41 7 - 52 U/L    Alkaline Phosphatase 84 34 - 104 U/L    Total Protein 7.1 6.4 - 8.4 g/dL    Albumin 4.0 3.5 - 5.0 g/dL    Total Bilirubin 0.38 0.20 - 1.00 mg/dL    eGFR 122 ml/min/1.73sq m   Lipase    Collection Time: 09/20/23  9:13 AM   Result Value Ref Range    Lipase 35 11 - 82 u/L   Quantitative hCG    Collection Time: 09/20/23  9:13 AM   Result Value Ref Range    HCG, Quant 3 0 - 5 mIU/mL       Imaging/  Pelvic ultrasound: FINDINGS:     UTERUS:  The uterus is anteverted in position, measuring 12.5 x 4.8 x 7.0 cm. The uterus has a normal contour and echotexture. The cervix appears within normal limits.     ENDOMETRIUM:  The endometrial echo complex has an AP caliber of 15.0 mm. Heterogeneous thickened endometrium. Retained products of conception cannot be excluded.     OVARIES/ADNEXA:  Right ovary:  3.5 x 0.8 x 1.3 cm. 1.9 mL. Left ovary:  1.9 x 1.0 x 1.9 cm. 2.0 mL. Ovarian Doppler flow is within normal limits. No suspicious ovarian or adnexal abnormality.     OTHER:  No free fluid or loculated fluid collections.        IMPRESSION:     Heterogeneous thickened endometrium measuring up to 15 mm. Retained products of conception cannot be excluded.

## 2023-09-20 NOTE — TELEPHONE ENCOUNTER
Dr. Evaristo Lopez received call from service patient called in with heavy PP bleeding. Dr Evaristo Lopez requested nursing staff to contact patient to triage. Spoke with Zen Boucher who states vaginal bleeding stopped 4 days ago. Prior to stopping had a day or two of brown blood. Woke up this morning and passed approx 6" red, gelatinous clot while on toilet-Bright red blood dripping. Mild pelvic cramping. Contacted Dr. Evaristo Lopez who recommends eval in ED. Return call to Zen Boucher who is in agreement.

## 2023-09-21 ENCOUNTER — POSTPARTUM VISIT (OUTPATIENT)
Dept: OBGYN CLINIC | Facility: CLINIC | Age: 33
End: 2023-09-21
Payer: COMMERCIAL

## 2023-09-21 VITALS
SYSTOLIC BLOOD PRESSURE: 104 MMHG | HEIGHT: 65 IN | WEIGHT: 155 LBS | BODY MASS INDEX: 25.83 KG/M2 | DIASTOLIC BLOOD PRESSURE: 64 MMHG

## 2023-09-21 DIAGNOSIS — N93.9 VAGINAL BLEEDING: Primary | ICD-10-CM

## 2023-09-21 PROCEDURE — 99213 OFFICE O/P EST LOW 20 MIN: CPT | Performed by: OBSTETRICS & GYNECOLOGY

## 2023-09-21 NOTE — PROGRESS NOTES
Assessment/Plan:      Diagnoses and all orders for this visit:    Vaginal bleeding    -  Reviewed patient's photos on the phone of blood clots on her pad from yesteday morning. CBC, U/S results and findings on today's exam reviewed. Advised patient to discontinue Methergine and inform Ob/Gyn if bleeding reoccurs    -  Patient to follow up with me in office on 23, even if no reoccurrence of increase in lochia      Other orders  -     Milk Thistle 1000 MG CAPS; as directed Orally      Subjective:     Patient ID: Nevaeh Amaya is a 28 y.o. female. Patient had  on 23, had lochia until 23, no vaginal bleeding on 9/15, . Vaginal bleeding/lochia started on  and increased to "lemon size clot" the morning of . Patient was evaluated in ER on 23 with pelvic exam, labs and pelvic U/S. Minimum vaginal bleeding since starting Methergine 0.2 mg P.O. TID yesterday. Review of Systems   Constitutional: Negative for activity change and unexpected weight change. Genitourinary: Negative for pelvic pain, vaginal bleeding, vaginal discharge and vaginal pain. Objective:     Physical Exam  Vitals and nursing note reviewed. HENT:      Head: Normocephalic. Abdominal:      General: There is no distension. Palpations: Abdomen is soft. There is no mass. Tenderness: There is no abdominal tenderness. There is no rebound. Genitourinary:     General: Normal vulva. Exam position: Lithotomy position. Labia:         Right: No rash, tenderness or lesion. Left: No rash, tenderness or lesion. Urethra: No urethral pain or urethral lesion. Vagina: Normal. No vaginal discharge. Cervix: No cervical motion tenderness, lesion or erythema. Uterus: Normal.       Adnexa: Right adnexa normal and left adnexa normal.      Rectum: No external hemorrhoid. Comments: Cervix closed. No active bleeding from the os.   Scant lochia noted in vaginal vault. No CMT or uterine tenderness  Musculoskeletal:      Right lower leg: No edema. Left lower leg: No edema. Skin:     General: Skin is warm. Neurological:      Mental Status: She is alert and oriented to person, place, and time. Psychiatric:         Mood and Affect: Mood normal.         Behavior: Behavior normal.         Thought Content:  Thought content normal.

## 2023-09-27 ENCOUNTER — POSTPARTUM VISIT (OUTPATIENT)
Dept: OBGYN CLINIC | Facility: CLINIC | Age: 33
End: 2023-09-27

## 2023-09-27 VITALS
HEIGHT: 65 IN | BODY MASS INDEX: 25.36 KG/M2 | SYSTOLIC BLOOD PRESSURE: 108 MMHG | WEIGHT: 152.2 LBS | DIASTOLIC BLOOD PRESSURE: 60 MMHG

## 2023-09-27 DIAGNOSIS — Z87.59 HISTORY OF POSTPARTUM HEMORRHAGE: Primary | ICD-10-CM

## 2023-09-27 PROBLEM — O09.899 HIGH RISK PREGNANCY WITH HIGH INHIBIN: Status: RESOLVED | Noted: 2023-04-24 | Resolved: 2023-09-27

## 2023-09-27 PROBLEM — Z3A.39 39 WEEKS GESTATION OF PREGNANCY: Status: RESOLVED | Noted: 2023-04-20 | Resolved: 2023-09-27

## 2023-09-27 PROBLEM — O28.8 HIGH RISK PREGNANCY WITH HIGH INHIBIN: Status: RESOLVED | Noted: 2023-04-24 | Resolved: 2023-09-27

## 2023-09-27 PROCEDURE — 99024 POSTOP FOLLOW-UP VISIT: CPT | Performed by: OBSTETRICS & GYNECOLOGY

## 2023-09-27 NOTE — PROGRESS NOTES
Assessment/Plan:      Diagnoses and all orders for this visit:    History of postpartum hemorrhage  -  Informed patient of unremarkable findings on exam.  Repeating U/S recommended to follow up on EMS  -     US pelvis complete w transvaginal; Future        -  Will call patient to inform if any significant findings noted on exam, otherwise will follow up during scheduled postpartum visit    Normal spontaneous vaginal delivery        -  Patient breastfeeding son. Denies having any concerns    Subjective:     Patient ID: Sahil Encarnacion is a 28 y.o. female. Patient presents for follow up after having one episode of increased bleeding, postpartum. Patient stopped taking Methergine 9/22/23 and has not had reoccurence of increase in lochia. Denies having any pelvic discomfort. Breastfeeding son. Review of Systems   Constitutional: Negative for activity change, chills, fever and unexpected weight change. Genitourinary: Negative for dysuria, menstrual problem, pelvic pain, vaginal bleeding, vaginal discharge and vaginal pain. Objective:     Physical Exam  Vitals and nursing note reviewed. HENT:      Head: Normocephalic. Abdominal:      General: There is no distension. Palpations: Abdomen is soft. There is no mass. Tenderness: There is no abdominal tenderness. There is no rebound. Genitourinary:     General: Normal vulva. Exam position: Lithotomy position. Labia:         Right: No rash, tenderness or lesion. Left: No rash, tenderness or lesion. Urethra: No urethral pain or urethral lesion. Vagina: Normal. No vaginal discharge. Cervix: No cervical motion tenderness, lesion or erythema. Uterus: Normal.       Adnexa: Right adnexa normal and left adnexa normal.      Rectum: No external hemorrhoid. Comments: Cervix closed, no active bleeding noted. No CMT or uterine tenderness  Musculoskeletal:      Right lower leg: No edema.       Left lower leg: No edema. Skin:     General: Skin is warm. Neurological:      Mental Status: She is alert and oriented to person, place, and time. Psychiatric:         Mood and Affect: Mood normal.         Behavior: Behavior normal.         Thought Content:  Thought content normal.

## 2023-10-11 ENCOUNTER — POSTPARTUM VISIT (OUTPATIENT)
Dept: OBGYN CLINIC | Facility: CLINIC | Age: 33
End: 2023-10-11

## 2023-10-11 VITALS
HEIGHT: 65 IN | SYSTOLIC BLOOD PRESSURE: 98 MMHG | WEIGHT: 151 LBS | DIASTOLIC BLOOD PRESSURE: 68 MMHG | BODY MASS INDEX: 25.16 KG/M2

## 2023-10-11 DIAGNOSIS — Z87.42 HISTORY OF IRREGULAR MENSTRUAL BLEEDING: ICD-10-CM

## 2023-10-11 PROCEDURE — 99024 POSTOP FOLLOW-UP VISIT: CPT | Performed by: OBSTETRICS & GYNECOLOGY

## 2023-10-11 NOTE — PROGRESS NOTES
Routine Post Partum Visit  [address]  [address] [facility], [address], [place], [address] [facility]    Assessment/Plan:  Ranjeet Denny is a 28y.o. year old [id number] who presents for postpartum visit. Routine Postpartum Care  Normal postpartum exam  Contraception: condoms  Depression Screen: PPD screen score: negative  Feeding:  She is breast feeding exclusively. Psychosocial support: present  Cervical cancer screening Up to Date  Follow up in: 3 months for wellness visit, or as needed. Additional Problems:  1. Postpartum exam    2. History of irregular menstrual bleeding  -     US pelvis complete w transvaginal; Future; Expected date: 10/11/2023  -     US pelvis complete w transvaginal; Future; Expected date: 10/11/2023        Next visit: 3 months Wellness    Subjective:     CC: Postpartum visit    Dayton Serrano is a 28 y.o. y.o. female [id number] who presents for a postpartum visit. She is 6 weeks postpartum following a spontaneous vaginal delivery on 9/4/23 at 40.0 weeks. Outcome: spontaneous vaginal delivery, none  Anesthesia: epidural. Postpartum course has been complicated by one episode of postpatum hemorrhage . Baby's course has been uncomplicated. Baby is feeding by She is breast feeding exclusively. .     Bleeding no bleeding. Bowel function is normal. Bladder function is normal. Patient is not sexually active since delivery. Contraception method is condoms. Postpartum depression screening: negative.     The following portions of the patient's history were reviewed and updated as appropriate: allergies, current medications, past family history, past medical history, obstetric history, gynecologic history, past social history, past surgical history and problem list.      Objective:  BP 98/68   Ht 5' 5" (1.651 m)   Wt 68.5 kg (151 lb)   LMP 11/28/2022 (Approximate) Comment: pt had delivery 9/4  Breastfeeding Yes   BMI 25.13 kg/m²   Pregravid Weight/BMI: No episode found (BMI Could not be calculated)  Current Weight: 68.5 kg (151 lb)   Total Weight Gain: Not found. General: Well appearing, no distress. Mood and affect: Appropriate.   Abdomen: Soft, nontender  Vulva: normal  Vagina: normal, no abnormal discharge  Cervix: closed, no bleeding  Uterus: non-tender, normal size  Adnexa: no masses, no tenderness

## 2023-10-26 ENCOUNTER — TELEPHONE (OUTPATIENT)
Dept: OBGYN CLINIC | Facility: CLINIC | Age: 33
End: 2023-10-26

## 2023-10-26 NOTE — TELEPHONE ENCOUNTER
Received overdue notification for recommended follow-up US. Chart indicates-NO show   Left a message for patient to please call back or has she rescheduled.